# Patient Record
Sex: FEMALE | Race: WHITE | NOT HISPANIC OR LATINO | Employment: OTHER | URBAN - NONMETROPOLITAN AREA
[De-identification: names, ages, dates, MRNs, and addresses within clinical notes are randomized per-mention and may not be internally consistent; named-entity substitution may affect disease eponyms.]

---

## 2022-08-03 ENCOUNTER — LAB (OUTPATIENT)
Dept: CARDIOLOGY | Facility: CLINIC | Age: 72
End: 2022-08-03

## 2022-08-03 ENCOUNTER — OFFICE VISIT (OUTPATIENT)
Dept: CARDIOLOGY | Facility: CLINIC | Age: 72
End: 2022-08-03

## 2022-08-03 VITALS
WEIGHT: 101.4 LBS | OXYGEN SATURATION: 92 % | DIASTOLIC BLOOD PRESSURE: 92 MMHG | BODY MASS INDEX: 19.14 KG/M2 | SYSTOLIC BLOOD PRESSURE: 125 MMHG | HEIGHT: 61 IN | HEART RATE: 69 BPM

## 2022-08-03 DIAGNOSIS — R06.02 SOB (SHORTNESS OF BREATH): ICD-10-CM

## 2022-08-03 DIAGNOSIS — R93.89 ABNORMAL CT OF THE CHEST: ICD-10-CM

## 2022-08-03 DIAGNOSIS — R53.82 CHRONIC FATIGUE: ICD-10-CM

## 2022-08-03 DIAGNOSIS — R07.89 CHEST PAIN, ATYPICAL: ICD-10-CM

## 2022-08-03 DIAGNOSIS — R07.89 CHEST PAIN, ATYPICAL: Primary | ICD-10-CM

## 2022-08-03 LAB
ALBUMIN SERPL-MCNC: 4.37 G/DL (ref 3.5–5.2)
ALBUMIN/GLOB SERPL: 1.4 G/DL
ALP SERPL-CCNC: 68 U/L (ref 39–117)
ALT SERPL W P-5'-P-CCNC: 12 U/L (ref 1–33)
ANION GAP SERPL CALCULATED.3IONS-SCNC: 11.4 MMOL/L (ref 5–15)
AST SERPL-CCNC: 24 U/L (ref 1–32)
BASOPHILS # BLD AUTO: 0.07 10*3/MM3 (ref 0–0.2)
BASOPHILS NFR BLD AUTO: 1 % (ref 0–1.5)
BILIRUB SERPL-MCNC: 0.2 MG/DL (ref 0–1.2)
BUN SERPL-MCNC: 14 MG/DL (ref 8–23)
BUN/CREAT SERPL: 23.3 (ref 7–25)
CALCIUM SPEC-SCNC: 9.7 MG/DL (ref 8.6–10.5)
CHLORIDE SERPL-SCNC: 104 MMOL/L (ref 98–107)
CHOLEST SERPL-MCNC: 244 MG/DL (ref 0–200)
CO2 SERPL-SCNC: 22.6 MMOL/L (ref 22–29)
CREAT SERPL-MCNC: 0.6 MG/DL (ref 0.57–1)
DEPRECATED RDW RBC AUTO: 49.9 FL (ref 37–54)
EGFRCR SERPLBLD CKD-EPI 2021: 94.3 ML/MIN/1.73
EOSINOPHIL # BLD AUTO: 0.87 10*3/MM3 (ref 0–0.4)
EOSINOPHIL NFR BLD AUTO: 12.9 % (ref 0.3–6.2)
ERYTHROCYTE [DISTWIDTH] IN BLOOD BY AUTOMATED COUNT: 13.5 % (ref 12.3–15.4)
GLOBULIN UR ELPH-MCNC: 3 GM/DL
GLUCOSE SERPL-MCNC: 81 MG/DL (ref 65–99)
HCT VFR BLD AUTO: 41.2 % (ref 34–46.6)
HDLC SERPL-MCNC: 59 MG/DL (ref 40–60)
HGB BLD-MCNC: 13.2 G/DL (ref 12–15.9)
IMM GRANULOCYTES # BLD AUTO: 0.03 10*3/MM3 (ref 0–0.05)
IMM GRANULOCYTES NFR BLD AUTO: 0.4 % (ref 0–0.5)
LDLC SERPL CALC-MCNC: 171 MG/DL (ref 0–100)
LDLC/HDLC SERPL: 2.86 {RATIO}
LYMPHOCYTES # BLD AUTO: 1.78 10*3/MM3 (ref 0.7–3.1)
LYMPHOCYTES NFR BLD AUTO: 26.3 % (ref 19.6–45.3)
MAGNESIUM SERPL-MCNC: 2.3 MG/DL (ref 1.6–2.4)
MCH RBC QN AUTO: 32 PG (ref 26.6–33)
MCHC RBC AUTO-ENTMCNC: 32 G/DL (ref 31.5–35.7)
MCV RBC AUTO: 99.8 FL (ref 79–97)
MONOCYTES # BLD AUTO: 1.08 10*3/MM3 (ref 0.1–0.9)
MONOCYTES NFR BLD AUTO: 16 % (ref 5–12)
NEUTROPHILS NFR BLD AUTO: 2.94 10*3/MM3 (ref 1.7–7)
NEUTROPHILS NFR BLD AUTO: 43.4 % (ref 42.7–76)
NRBC BLD AUTO-RTO: 0 /100 WBC (ref 0–0.2)
PLATELET # BLD AUTO: 231 10*3/MM3 (ref 140–450)
PMV BLD AUTO: 10.8 FL (ref 6–12)
POTASSIUM SERPL-SCNC: 5.1 MMOL/L (ref 3.5–5.2)
PROT SERPL-MCNC: 7.4 G/DL (ref 6–8.5)
RBC # BLD AUTO: 4.13 10*6/MM3 (ref 3.77–5.28)
SODIUM SERPL-SCNC: 138 MMOL/L (ref 136–145)
TRIGL SERPL-MCNC: 81 MG/DL (ref 0–150)
TROPONIN T SERPL-MCNC: <0.01 NG/ML (ref 0–0.03)
TSH SERPL DL<=0.05 MIU/L-ACNC: 3.74 UIU/ML (ref 0.27–4.2)
VLDLC SERPL-MCNC: 14 MG/DL (ref 5–40)
WBC NRBC COR # BLD: 6.77 10*3/MM3 (ref 3.4–10.8)

## 2022-08-03 PROCEDURE — 84484 ASSAY OF TROPONIN QUANT: CPT | Performed by: NURSE PRACTITIONER

## 2022-08-03 PROCEDURE — 93000 ELECTROCARDIOGRAM COMPLETE: CPT | Performed by: NURSE PRACTITIONER

## 2022-08-03 PROCEDURE — 99204 OFFICE O/P NEW MOD 45 MIN: CPT | Performed by: NURSE PRACTITIONER

## 2022-08-03 PROCEDURE — 84443 ASSAY THYROID STIM HORMONE: CPT | Performed by: NURSE PRACTITIONER

## 2022-08-03 PROCEDURE — 83735 ASSAY OF MAGNESIUM: CPT | Performed by: NURSE PRACTITIONER

## 2022-08-03 PROCEDURE — 80061 LIPID PANEL: CPT | Performed by: NURSE PRACTITIONER

## 2022-08-03 PROCEDURE — 85025 COMPLETE CBC W/AUTO DIFF WBC: CPT | Performed by: NURSE PRACTITIONER

## 2022-08-03 PROCEDURE — 80053 COMPREHEN METABOLIC PANEL: CPT | Performed by: NURSE PRACTITIONER

## 2022-08-03 PROCEDURE — 36415 COLL VENOUS BLD VENIPUNCTURE: CPT

## 2022-08-03 RX ORDER — MIRTAZAPINE 15 MG/1
15 TABLET, FILM COATED ORAL NIGHTLY
COMMUNITY

## 2022-08-03 RX ORDER — NITROGLYCERIN 0.4 MG/1
TABLET SUBLINGUAL
Qty: 30 TABLET | Refills: 5 | Status: SHIPPED | OUTPATIENT
Start: 2022-08-03

## 2022-08-03 NOTE — PROGRESS NOTES
Subjective     Maria R Boss is a 72 y.o. female who presents to day for CAD on CT  (Referred by Dr Barahona).    CHIEF COMPLIANT  Chief Complaint   Patient presents with   • CAD on CT      Referred by Dr Barahona       Active Problems:  Problem List Items Addressed This Visit    None     Visit Diagnoses     Chest pain, atypical    -  Primary    Relevant Medications    nitroglycerin (NITROSTAT) 0.4 MG SL tablet    Other Relevant Orders    ECG 12 Lead    CBC & Differential    Comprehensive Metabolic Panel    Lipid Panel    TSH    Magnesium    Troponin    Adult Transthoracic Echo Complete W/ Cont if Necessary Per Protocol    Stress Test With Myocardial Perfusion One Day    SOB (shortness of breath)        Relevant Orders    ECG 12 Lead    CBC & Differential    Comprehensive Metabolic Panel    Lipid Panel    TSH    Magnesium    Troponin    Adult Transthoracic Echo Complete W/ Cont if Necessary Per Protocol    Stress Test With Myocardial Perfusion One Day    Chronic fatigue        Relevant Orders    ECG 12 Lead    CBC & Differential    Comprehensive Metabolic Panel    Lipid Panel    TSH    Magnesium    Troponin    Adult Transthoracic Echo Complete W/ Cont if Necessary Per Protocol    Stress Test With Myocardial Perfusion One Day    Abnormal CT of the chest        Relevant Orders    ECG 12 Lead    CBC & Differential    Comprehensive Metabolic Panel    Lipid Panel    TSH    Magnesium    Troponin    Adult Transthoracic Echo Complete W/ Cont if Necessary Per Protocol    Stress Test With Myocardial Perfusion One Day      Problem list  1.  Chest pain  2.  Shortness of breath  3.  Fatigue  4.  Coronary artery disease on CT of chest    HPI  HPI  Ms. Boss is a 72-year-old female patient who is being seen today to establish care for cardiac evaluation of atherosclerotic heart disease on CT of the chest.  Patient also reports chest pain that occurs in her midsternal and radiates through to her back and her left arm at times.  It is  intermittent but occurs on a daily basis sometimes multiple times a day.  She describes as a heaviness or squeezing-like sensation.  She says it last for an average of 15 to 20 minutes.  Usually occurs when she is exerting or overexerts herself.  She says rest usually does help resolve her pain.  She has not had any nitroglycerin.  She says it does get to a severity of 8-10/2010.  She says at times she even gets a really hot feeling.  She also reports some intermittent shortness of breath that mainly occurs if she is walking this distance.  She usually does not experience any dyspnea at rest.  She does use oxygen at night and does seem like that helps quite a bit.  She is active on a daily basis and mows her yard and stays active.  17 years ago she had a pneumothorax and ever since she has had breathing troubles.  She has history of diagnosed with COPD.  Overall she feels like she has been relatively healthy.  She denies any lower extremity edema, palpitations, dizziness, lightheadedness, syncope, PND, orthopnea, or strokelike symptoms.  PRIOR MEDS  Current Outpatient Medications on File Prior to Visit   Medication Sig Dispense Refill   • Fluticasone-Umeclidin-Vilant (Trelegy Ellipta) 100-62.5-25 MCG/INH inhaler Inhale 1 puff Daily.     • mirtazapine (REMERON) 15 MG tablet Take 15 mg by mouth Every Night. 1/2 tab po qd       No current facility-administered medications on file prior to visit.       ALLERGIES  Patient has no allergy information on record.    HISTORY  Past Medical History:   Diagnosis Date   • COPD (chronic obstructive pulmonary disease) ([facility])        Social History     Socioeconomic History   • Marital status:    Tobacco Use   • Smoking status: Former Smoker     Years: 55.00   • Smokeless tobacco: Never Used   Substance and Sexual Activity   • Alcohol use: Never   • Drug use: Never   • Sexual activity: Defer       Family History   Problem Relation Age of Onset   • Heart disease Mother    •  "Heart disease Father    • Cancer Father        Review of Systems   Constitutional: Positive for fatigue. Negative for chills and fever.   HENT: Positive for sore throat. Negative for congestion and rhinorrhea.    Respiratory: Positive for shortness of breath (with exertion). Negative for chest tightness.    Cardiovascular: Positive for chest pain (in middle of chest ). Negative for palpitations and leg swelling.   Gastrointestinal: Negative for constipation, diarrhea and nausea.   Musculoskeletal: Positive for arthralgias (hands). Negative for back pain and neck pain.   Allergic/Immunologic: Positive for environmental allergies. Negative for food allergies.   Neurological: Positive for weakness. Negative for dizziness, syncope and light-headedness.   Hematological: Bruises/bleeds easily.   Psychiatric/Behavioral: Positive for sleep disturbance (O2 at night ).       Objective     VITALS: /92 (BP Location: Left arm, Patient Position: Sitting)   Pulse 69   Ht 154.9 cm (61\")   Wt 46 kg (101 lb 6.4 oz)   SpO2 92%   BMI 19.16 kg/m²     LABS:   Lab Results (most recent)     None          IMAGING:   No Images in the past 120 days found..    EXAM:  Physical Exam  Vitals and nursing note reviewed.   Constitutional:       Appearance: She is well-developed.   HENT:      Head: Normocephalic.   Eyes:      Pupils: Pupils are equal, round, and reactive to light.   Neck:      Thyroid: No thyroid mass.      Vascular: No carotid bruit or JVD.      Trachea: Trachea and phonation normal.   Cardiovascular:      Rate and Rhythm: Normal rate and regular rhythm.      Pulses:           Radial pulses are 2+ on the right side and 2+ on the left side.        Posterior tibial pulses are 2+ on the right side and 2+ on the left side.      Heart sounds: Normal heart sounds. No murmur heard.    No friction rub. No gallop.   Pulmonary:      Effort: Pulmonary effort is normal. No respiratory distress.      Breath sounds: Normal breath " sounds. No wheezing or rales.   Abdominal:      General: Bowel sounds are normal.      Palpations: Abdomen is soft.   Musculoskeletal:         General: No swelling. Normal range of motion.      Cervical back: Neck supple.   Skin:     General: Skin is warm and dry.      Capillary Refill: Capillary refill takes less than 2 seconds.      Findings: No rash.   Neurological:      Mental Status: She is alert and oriented to person, place, and time.   Psychiatric:         Speech: Speech normal.         Behavior: Behavior normal.         Thought Content: Thought content normal.         Judgment: Judgment normal.         Procedure     ECG 12 Lead    Date/Time: 8/3/2022 11:40 AM  Performed by: Shwan Aranda APRN  Authorized by: Shawn Aranda APRN   Previous ECG: no previous ECG available  Rhythm: sinus rhythm  Rate: normal  BPM: 68  QRS axis: normal  Other findings: non-specific ST-T wave changes  Comments: 427 ms  No acute changes                 Assessment & Plan    Diagnosis Plan   1. Chest pain, atypical  ECG 12 Lead    CBC & Differential    Comprehensive Metabolic Panel    Lipid Panel    TSH    Magnesium    nitroglycerin (NITROSTAT) 0.4 MG SL tablet    Troponin    Adult Transthoracic Echo Complete W/ Cont if Necessary Per Protocol    Stress Test With Myocardial Perfusion One Day   2. SOB (shortness of breath)  ECG 12 Lead    CBC & Differential    Comprehensive Metabolic Panel    Lipid Panel    TSH    Magnesium    Troponin    Adult Transthoracic Echo Complete W/ Cont if Necessary Per Protocol    Stress Test With Myocardial Perfusion One Day   3. Chronic fatigue  ECG 12 Lead    CBC & Differential    Comprehensive Metabolic Panel    Lipid Panel    TSH    Magnesium    Troponin    Adult Transthoracic Echo Complete W/ Cont if Necessary Per Protocol    Stress Test With Myocardial Perfusion One Day   4. Abnormal CT of the chest  ECG 12 Lead    CBC & Differential    Comprehensive Metabolic Panel    Lipid Panel    TSH     Magnesium    Troponin    Adult Transthoracic Echo Complete W/ Cont if Necessary Per Protocol    Stress Test With Myocardial Perfusion One Day   1.  Due to patient's abnormal CT that showed atherosclerotic heart disease, chest pain that occurs with exertion, and shortness of breath I do feel it is appropriate to have patient undergo ischemic work-up including stress test and echocardiogram.  2.  I would also like to do an extensive laboratory work-up including CBC CMP, lipid panel, TSH, magnesium, and troponin.  This will help evaluate patient for potential symptoms or other chronic disease processes that have not been diagnosed.  3.  Patient's blood pressure is elevated little today which probably most likely situational.  She will continue to monitor blood pressure we will reevaluate on follow-up.  4.  Informed of signs and symptoms of ACS and advised to seek emergent treatment for any new worsening symptoms.  Patient also advised sooner follow-up as needed.  Also advised to follow-up with family doctor as needed  This note is dictated utilizing voice recognition software.  Although this record has been proof read, transcriptional errors may still be present. If questions occur regarding the content of this record please do not hesitate to call our office.  I have reviewed and confirmed the accuracy of the ROS as documented by the MA/LPN/RN DELILAH Richardson    Return in about 3 months (around 11/3/2022), or if symptoms worsen or fail to improve.    Diagnoses and all orders for this visit:    1. Chest pain, atypical (Primary)  -     ECG 12 Lead  -     CBC & Differential; Future  -     Comprehensive Metabolic Panel; Future  -     Lipid Panel; Future  -     TSH; Future  -     Magnesium; Future  -     nitroglycerin (NITROSTAT) 0.4 MG SL tablet; 1 under the tongue as needed for angina, may repeat q5mins for up three doses  Dispense: 30 tablet; Refill: 5  -     Troponin; Future  -     Adult Transthoracic Echo  Complete W/ Cont if Necessary Per Protocol; Future  -     Stress Test With Myocardial Perfusion One Day; Future    2. SOB (shortness of breath)  -     ECG 12 Lead  -     CBC & Differential; Future  -     Comprehensive Metabolic Panel; Future  -     Lipid Panel; Future  -     TSH; Future  -     Magnesium; Future  -     Troponin; Future  -     Adult Transthoracic Echo Complete W/ Cont if Necessary Per Protocol; Future  -     Stress Test With Myocardial Perfusion One Day; Future    3. Chronic fatigue  -     ECG 12 Lead  -     CBC & Differential; Future  -     Comprehensive Metabolic Panel; Future  -     Lipid Panel; Future  -     TSH; Future  -     Magnesium; Future  -     Troponin; Future  -     Adult Transthoracic Echo Complete W/ Cont if Necessary Per Protocol; Future  -     Stress Test With Myocardial Perfusion One Day; Future    4. Abnormal CT of the chest  -     ECG 12 Lead  -     CBC & Differential; Future  -     Comprehensive Metabolic Panel; Future  -     Lipid Panel; Future  -     TSH; Future  -     Magnesium; Future  -     Troponin; Future  -     Adult Transthoracic Echo Complete W/ Cont if Necessary Per Protocol; Future  -     Stress Test With Myocardial Perfusion One Day; Future        Maria R Boss  reports that she has quit smoking. She quit after 55.00 years of use. She has never used smokeless tobacco.. I have educated her on the risk of diseases from using tobacco products .    Advance Care Planning   ACP discussion was held with the patient during this visit. Patient does not have an advance directive, declines further assistance.              MEDS ORDERED DURING VISIT:  New Medications Ordered This Visit   Medications   • nitroglycerin (NITROSTAT) 0.4 MG SL tablet     Si under the tongue as needed for angina, may repeat q5mins for up three doses     Dispense:  30 tablet     Refill:  5           This document has been electronically signed by Shawn Aranda Jr., APRN  August 3, 2022 13:47 EDT

## 2022-08-04 ENCOUNTER — TELEPHONE (OUTPATIENT)
Dept: CARDIOLOGY | Facility: CLINIC | Age: 72
End: 2022-08-04

## 2022-08-04 NOTE — PROGRESS NOTES
Labs are relatively within normal limits except for her LDL that is 177.  Patient was found to have an elevated LDL per laboratory work-up.  Discuss lifestyle modifications that would help reduce their LDL cholesterol.  Discussed decreasing foods of animal product such as meats cheese and eggs.  And when choosing me chicken or fish are a better alternative than red meats.    See if patient would be willing to start statin therapy of rosuvastatin 20 mg daily.

## 2022-08-04 NOTE — TELEPHONE ENCOUNTER
Shawn Aranda, DELILAH Snider, FELIX Reyes  Labs are relatively within normal limits except for her LDL that is 177.  Patient was found to have an elevated LDL per laboratory work-up.  Discuss lifestyle modifications that would help reduce their LDL cholesterol.  Discussed decreasing foods of animal product such as meats cheese and eggs.  And when choosing me chicken or fish are a better alternative than red meats.     See if patient would be willing to start statin therapy of rosuvastatin 20 mg daily.     Patient was notified she will modify her diet. She does not wants to start any new medications at this time. She said she has very little help and would not be able to pick it up.      Sosa JOHNSA

## 2022-08-12 ENCOUNTER — HOSPITAL ENCOUNTER (OUTPATIENT)
Dept: CARDIOLOGY | Facility: HOSPITAL | Age: 72
Discharge: HOME | End: 2022-08-12

## 2022-08-12 ENCOUNTER — APPOINTMENT (OUTPATIENT)
Dept: CARDIOLOGY | Facility: HOSPITAL | Age: 72
End: 2022-08-12

## 2022-08-12 VITALS — WEIGHT: 101.41 LBS | HEIGHT: 61 IN | BODY MASS INDEX: 19.15 KG/M2

## 2022-08-12 DIAGNOSIS — R53.82 CHRONIC FATIGUE: ICD-10-CM

## 2022-08-12 DIAGNOSIS — R06.02 SOB (SHORTNESS OF BREATH): ICD-10-CM

## 2022-08-12 DIAGNOSIS — R07.89 CHEST PAIN, ATYPICAL: ICD-10-CM

## 2022-08-12 DIAGNOSIS — R93.89 ABNORMAL CT OF THE CHEST: ICD-10-CM

## 2022-08-12 PROCEDURE — 93306 TTE W/DOPPLER COMPLETE: CPT | Performed by: INTERNAL MEDICINE

## 2022-08-12 PROCEDURE — 93306 TTE W/DOPPLER COMPLETE: CPT

## 2022-08-12 PROCEDURE — A9500 TC99M SESTAMIBI: HCPCS | Performed by: INTERNAL MEDICINE

## 2022-08-12 PROCEDURE — 93018 CV STRESS TEST I&R ONLY: CPT | Performed by: INTERNAL MEDICINE

## 2022-08-12 PROCEDURE — 0 TECHNETIUM SESTAMIBI: Performed by: INTERNAL MEDICINE

## 2022-08-12 PROCEDURE — 25010000002 REGADENOSON 0.4 MG/5ML SOLUTION: Performed by: INTERNAL MEDICINE

## 2022-08-12 PROCEDURE — 78452 HT MUSCLE IMAGE SPECT MULT: CPT

## 2022-08-12 PROCEDURE — 78452 HT MUSCLE IMAGE SPECT MULT: CPT | Performed by: INTERNAL MEDICINE

## 2022-08-12 PROCEDURE — 93017 CV STRESS TEST TRACING ONLY: CPT

## 2022-08-12 RX ADMIN — TECHNETIUM TC 99M SESTAMIBI 1 DOSE: 1 INJECTION INTRAVENOUS at 11:24

## 2022-08-12 RX ADMIN — TECHNETIUM TC 99M SESTAMIBI 1 DOSE: 1 INJECTION INTRAVENOUS at 13:28

## 2022-08-12 RX ADMIN — REGADENOSON 0.4 MG: 0.08 INJECTION, SOLUTION INTRAVENOUS at 13:28

## 2022-08-15 ENCOUNTER — TELEPHONE (OUTPATIENT)
Dept: CARDIOLOGY | Facility: CLINIC | Age: 72
End: 2022-08-15

## 2022-08-15 LAB
BH CV REST NUCLEAR ISOTOPE DOSE: 10 MCI
BH CV STRESS COMMENTS STAGE 1: NORMAL
BH CV STRESS DOSE REGADENOSON STAGE 1: 0.4
BH CV STRESS DURATION MIN STAGE 1: 0
BH CV STRESS DURATION SEC STAGE 1: 10
BH CV STRESS NUCLEAR ISOTOPE DOSE: 30 MCI
BH CV STRESS PROTOCOL 1: NORMAL
BH CV STRESS RECOVERY BP: NORMAL MMHG
BH CV STRESS RECOVERY HR: 83 BPM
BH CV STRESS STAGE 1: 1
MAXIMAL PREDICTED HEART RATE: 146 BPM
PERCENT MAX PREDICTED HR: 62.33 %
STRESS BASELINE BP: NORMAL MMHG
STRESS BASELINE HR: 70 BPM
STRESS PERCENT HR: 73 %
STRESS POST PEAK BP: NORMAL MMHG
STRESS POST PEAK HR: 91 BPM
STRESS TARGET HR: 124 BPM

## 2022-08-15 NOTE — TELEPHONE ENCOUNTER
----- Message from DELILAH Richardson sent at 8/15/2022  8:14 AM EDT -----  Positive stress test.  1 to 2-week follow-up.      Called patient but did not get a voicemail will try back later today.    Sosa HOFFMAN

## 2022-08-19 NOTE — TELEPHONE ENCOUNTER
Patient called along with daughter in law (Niesha hoffman) they both were speaking with me regarding this. Maria R gave me a verbal okay to speak to them both and they will update hippa at upcoming appointment.    I advised them of results below. Patient states she will be at [facility] and verbally understands.    DAIJA KATZ MA

## 2022-08-24 LAB
AORTIC DIMENSIONLESS INDEX: 0.81 (DI)
BH CV ECHO MEAS - ACS: 1.39 CM
BH CV ECHO MEAS - AO MAX PG: 5.6 MMHG
BH CV ECHO MEAS - AO MEAN PG: 2.7 MMHG
BH CV ECHO MEAS - AO ROOT DIAM: 2.48 CM
BH CV ECHO MEAS - AO V2 MAX: 117.9 CM/SEC
BH CV ECHO MEAS - AO V2 VTI: 26.5 CM
BH CV ECHO MEAS - EDV(CUBED): 42.9 ML
BH CV ECHO MEAS - EF_3D-VOL: 59 %
BH CV ECHO MEAS - ESV(CUBED): 14 ML
BH CV ECHO MEAS - FS: 31.1 %
BH CV ECHO MEAS - IVS/LVPW: 1.18 CM
BH CV ECHO MEAS - IVSD: 0.85 CM
BH CV ECHO MEAS - LA DIMENSION: 2.8 CM
BH CV ECHO MEAS - LAT PEAK E' VEL: 8.4 CM/SEC
BH CV ECHO MEAS - LV MASS(C)D: 73.3 GRAMS
BH CV ECHO MEAS - LV MAX PG: 3.7 MMHG
BH CV ECHO MEAS - LV MEAN PG: 1.5 MMHG
BH CV ECHO MEAS - LV V1 MAX: 96.4 CM/SEC
BH CV ECHO MEAS - LV V1 VTI: 19.7 CM
BH CV ECHO MEAS - LVIDD: 3.5 CM
BH CV ECHO MEAS - LVIDS: 2.41 CM
BH CV ECHO MEAS - LVPWD: 0.72 CM
BH CV ECHO MEAS - MED PEAK E' VEL: 7.6 CM/SEC
BH CV ECHO MEAS - MR MAX PG: 56.2 MMHG
BH CV ECHO MEAS - MR MAX VEL: 374.8 CM/SEC
BH CV ECHO MEAS - MV A MAX VEL: 73.5 CM/SEC
BH CV ECHO MEAS - MV DEC SLOPE: 297.3 CM/SEC2
BH CV ECHO MEAS - MV DEC TIME: 0.3 MSEC
BH CV ECHO MEAS - MV E MAX VEL: 54.6 CM/SEC
BH CV ECHO MEAS - MV E/A: 0.74
BH CV ECHO MEAS - MV MAX PG: 3.4 MMHG
BH CV ECHO MEAS - MV MEAN PG: 1.28 MMHG
BH CV ECHO MEAS - MV P1/2T: 76.1 MSEC
BH CV ECHO MEAS - MV V2 VTI: 30.2 CM
BH CV ECHO MEAS - MVA(P1/2T): 2.9 CM2
BH CV ECHO MEAS - PA V2 MAX: 92.7 CM/SEC
BH CV ECHO MEAS - RAP SYSTOLE: 10 MMHG
BH CV ECHO MEAS - RV MAX PG: 1.62 MMHG
BH CV ECHO MEAS - RV V1 MAX: 63.7 CM/SEC
BH CV ECHO MEAS - RV V1 VTI: 14.5 CM
BH CV ECHO MEAS - RVDD: 2.6 CM
BH CV ECHO MEAS - RVSP: 15.4 MMHG
BH CV ECHO MEAS - TAPSE (>1.6): 2 CM
BH CV ECHO MEAS - TR MAX PG: 5.4 MMHG
BH CV ECHO MEAS - TR MAX VEL: 115.9 CM/SEC
BH CV ECHO MEASUREMENTS AVERAGE E/E' RATIO: 6.83
BH CV XLRA - TDI S': 10 CM/SEC
IVRT: 106 MSEC
MAXIMAL PREDICTED HEART RATE: 146 BPM
SINUS: 2.6 CM
STJ: 1.93 CM
STRESS TARGET HR: 124 BPM

## 2022-08-25 ENCOUNTER — TELEPHONE (OUTPATIENT)
Dept: CARDIOLOGY | Facility: CLINIC | Age: 72
End: 2022-08-25

## 2022-08-25 NOTE — TELEPHONE ENCOUNTER
ECHO  Pt notified of no acute findings. Provider will discuss results at f/u. Pt reminded of appt date and time.    ----- Message from Amy Snider MA sent at 8/25/2022  9:56 AM EDT -----    ----- Message -----  From: Shawn Aranda APRN  Sent: 8/24/2022   4:50 PM EDT  To: Amy Snider MA    There is no acute findings on the echocardiogram.  Keep follow-up.  ----- Message -----  From: Enrico Saez MD  Sent: 8/24/2022   2:07 PM EDT  To: DELILAH Richardson

## 2022-08-31 ENCOUNTER — OFFICE VISIT (OUTPATIENT)
Dept: CARDIOLOGY | Facility: CLINIC | Age: 72
End: 2022-08-31

## 2022-08-31 VITALS
SYSTOLIC BLOOD PRESSURE: 142 MMHG | HEIGHT: 61 IN | BODY MASS INDEX: 18.92 KG/M2 | OXYGEN SATURATION: 93 % | DIASTOLIC BLOOD PRESSURE: 71 MMHG | WEIGHT: 100.2 LBS | HEART RATE: 70 BPM

## 2022-08-31 DIAGNOSIS — R06.02 SOB (SHORTNESS OF BREATH): ICD-10-CM

## 2022-08-31 DIAGNOSIS — E78.2 MIXED HYPERLIPIDEMIA: ICD-10-CM

## 2022-08-31 DIAGNOSIS — R94.39 ABNORMAL NUCLEAR STRESS TEST: ICD-10-CM

## 2022-08-31 DIAGNOSIS — R06.02 SHORTNESS OF BREATH: ICD-10-CM

## 2022-08-31 DIAGNOSIS — R07.2 PRECORDIAL PAIN: Primary | ICD-10-CM

## 2022-08-31 DIAGNOSIS — R93.89 ABNORMAL CT OF THE CHEST: ICD-10-CM

## 2022-08-31 PROCEDURE — 99214 OFFICE O/P EST MOD 30 MIN: CPT | Performed by: NURSE PRACTITIONER

## 2022-08-31 RX ORDER — ISOSORBIDE MONONITRATE 30 MG/1
30 TABLET, EXTENDED RELEASE ORAL DAILY
Qty: 30 TABLET | Refills: 11 | Status: SHIPPED | OUTPATIENT
Start: 2022-08-31 | End: 2023-03-01 | Stop reason: SDUPTHER

## 2022-08-31 RX ORDER — ALBUTEROL SULFATE 90 UG/1
AEROSOL, METERED RESPIRATORY (INHALATION)
COMMUNITY
Start: 2022-08-26

## 2022-08-31 RX ORDER — ROSUVASTATIN CALCIUM 20 MG/1
20 TABLET, COATED ORAL DAILY
Qty: 90 TABLET | Refills: 3 | Status: SHIPPED | OUTPATIENT
Start: 2022-08-31 | End: 2023-03-01 | Stop reason: SDUPTHER

## 2022-08-31 RX ORDER — ASPIRIN 81 MG/1
81 TABLET ORAL DAILY
Qty: 90 TABLET | Refills: 3 | Status: SHIPPED | OUTPATIENT
Start: 2022-08-31

## 2022-08-31 RX ORDER — METOPROLOL TARTRATE 100 MG/1
TABLET ORAL
Qty: 2 TABLET | Refills: 0 | Status: SHIPPED | OUTPATIENT
Start: 2022-08-31 | End: 2023-03-01

## 2022-10-21 ENCOUNTER — TELEPHONE (OUTPATIENT)
Dept: CARDIOLOGY | Facility: CLINIC | Age: 72
End: 2022-10-21

## 2022-10-21 DIAGNOSIS — R94.39 ABNORMAL NUCLEAR STRESS TEST: ICD-10-CM

## 2022-10-21 DIAGNOSIS — R93.89 ABNORMAL CT OF THE CHEST: ICD-10-CM

## 2022-10-21 DIAGNOSIS — R07.2 PRECORDIAL PAIN: ICD-10-CM

## 2022-10-21 DIAGNOSIS — R06.02 SHORTNESS OF BREATH: ICD-10-CM

## 2022-10-21 DIAGNOSIS — R06.02 SOB (SHORTNESS OF BREATH): ICD-10-CM

## 2022-10-21 NOTE — TELEPHONE ENCOUNTER
I contacted Sherry that I will pull the CTA from [facility] and give to the provider and someone will be calling them back. I have put results in Sosa box to go over with

## 2022-10-21 NOTE — TELEPHONE ENCOUNTER
Results pulled from [facility] and attached to order for  to review. Madiha Chang MA

## 2022-10-21 NOTE — TELEPHONE ENCOUNTER
Caller: Surinder Boss    Relationship: Self    Best call back number: [phone]    What is the best time to reach you: ANYTIME     Who are you requesting to speak with (clinical staff, provider,  specific staff member): ANYONE     What was the call regarding: PATIENT HAD CTA DONE ON 10.18.22 AT [facility]. WOULD LIKE TO KNOW IF OFFICE CAN REACH OUT FOR THAT TEST AND LET THE PATIENT KNOW OF THE RESULTS.     Do you require a callback: YES

## 2022-10-24 ENCOUNTER — TELEPHONE (OUTPATIENT)
Dept: CARDIOLOGY | Facility: CLINIC | Age: 72
End: 2022-10-24

## 2022-10-24 NOTE — TELEPHONE ENCOUNTER
Message routed to JR for results/recommendations. Left message for patient that we will call once available.

## 2022-10-24 NOTE — TELEPHONE ENCOUNTER
----- Message from DELILAH Richardson sent at 10/21/2022 12:20 PM EDT -----  Patient's coronary arteries with out any significant blockages.  However there was a pulmonary nodule that needs to be continue to be monitored.  Please see if patient has pulmonologist if not I would like to refer them for continued monitoring of nodules.  ----- Message -----  From: Interface, Scans Incoming  Sent: 10/21/2022  10:21 AM EDT  To: DELILAH Richardson      I called patient and left a detailed message on voicemail of above results. I asked patient to call back and let us know if she is currently being seen by pulmonology or if she would like us to refer her.      Sosa HOFFMAN

## 2022-10-24 NOTE — TELEPHONE ENCOUNTER
Caller: FRITZ    Relationship to patient: DAUGHTER IN LAW    Best call back number: [phone]    Patient is needing: PATIENT'S DAUGHTER IN LAW RETURNING CALL AND UNABLE TO WARM TRANSFER

## 2022-10-24 NOTE — TELEPHONE ENCOUNTER
FRITZ SCHAEFFER CALLED TO GET RECENT TEST RESULTS. WOULD LIKE A CALL BACK 'ASAP' FROM LUISA JESSICA.     [phone]

## 2022-10-25 NOTE — TELEPHONE ENCOUNTER
No blockages were noted on her coronaries per the CTA.  However it did look as if she did have some pulmonary nodules and a lymph node enlargement.  Therefore I did have Carla   refer patient to pulmonology for further evaluation and work-up from that standpoint

## 2022-10-27 ENCOUNTER — TELEPHONE (OUTPATIENT)
Dept: CARDIOLOGY | Facility: CLINIC | Age: 72
End: 2022-10-27

## 2022-10-27 NOTE — TELEPHONE ENCOUNTER
"    Caller: Surinder Boss    Relationship to patient: Self    Best call back number:  [phone]    Patient is needing: PT WAS REFERRED TO \"\"FOR NODULES ON HER LUNGS. DR. DAY IS REQUESTING THAT PT'S CTA OF HEART BE SENT TO HER . PT DID NOT HAVE FAX NUMBER         "

## 2022-11-16 ENCOUNTER — OFFICE VISIT (OUTPATIENT)
Dept: CARDIOLOGY | Facility: CLINIC | Age: 72
End: 2022-11-16

## 2022-11-16 VITALS
WEIGHT: 104.4 LBS | OXYGEN SATURATION: 92 % | BODY MASS INDEX: 19.71 KG/M2 | SYSTOLIC BLOOD PRESSURE: 128 MMHG | DIASTOLIC BLOOD PRESSURE: 76 MMHG | HEART RATE: 81 BPM | HEIGHT: 61 IN

## 2022-11-16 DIAGNOSIS — R07.2 PRECORDIAL PAIN: ICD-10-CM

## 2022-11-16 DIAGNOSIS — Q24.5 CORONARY-MYOCARDIAL BRIDGE: Primary | ICD-10-CM

## 2022-11-16 DIAGNOSIS — R06.02 SHORTNESS OF BREATH: ICD-10-CM

## 2022-11-16 DIAGNOSIS — E78.2 MIXED HYPERLIPIDEMIA: ICD-10-CM

## 2022-11-16 PROCEDURE — 99214 OFFICE O/P EST MOD 30 MIN: CPT | Performed by: NURSE PRACTITIONER

## 2022-11-16 RX ORDER — ARGININE 500 MG
500 TABLET ORAL DAILY
Qty: 30 TABLET | Refills: 6 | Status: SHIPPED | OUTPATIENT
Start: 2022-11-16 | End: 2022-12-21 | Stop reason: ALTCHOICE

## 2022-11-16 RX ORDER — ALBUTEROL SULFATE 2.5 MG/3ML
SOLUTION RESPIRATORY (INHALATION)
COMMUNITY
Start: 2022-11-10

## 2022-11-16 RX ORDER — BUDESONIDE 0.5 MG/2ML
INHALANT ORAL
COMMUNITY
Start: 2022-11-10

## 2022-11-16 NOTE — PROGRESS NOTES
Subjective     Surinder Boss is a 72 y.o. female who presents to day for Follow-up (3 month), Chest Pain (Not active), Shortness of Breath, and Dizziness.    CHIEF COMPLIANT  Chief Complaint   Patient presents with   • Follow-up     3 month   • Chest Pain     Not active   • Shortness of Breath   • Dizziness       Active Problems:  1.  Chest pain  1.1 stress test 8/22: Distal septal, anterior apical, and apical ischemia, post-rest ejection fraction 70%, transient ischemic dilation ratio 1.32  1.2 coronary CTA 8/22: No hemodynamically significant coronary artery stenosis  2.  Shortness of breath  2.1 echocardiogram 8/22: EF 55 to 60%, grade 1 diastolic dysfunction, physiological TR, trivial to mild MR, and mild AI, normal pulmonary pressures  3.  Fatigue  4.  Coronary artery disease on CT of chest    HPI    Surinder Boss is a 72-year-old female who is being followed up today for chest pain and dyspnea.The patient is accompanied by an adult female. She did undergo a cardiac CT angiogram on 08/31/2022. The CT angiogram showed relatively normal coronary arteries with minimal plaque and no hemodynamically significant stenosis. There were findings of a subcarinal node measuring 2.5 cm that is stable from prior study and atherosclerotic plaque in her aorta. There is evidence of emphysema and she had a 5 mm nodule seen on a prominent right lower lobe bronchial structure, mucous plugging possible.The patient has myocardial bridging of the obtuse margin. She also has chronic arterial hypertension which her blood pressure is controlled today at 128/76 mmHg with a heart rate of 81 beats per minute. She is on high intensity statin therapy of rosuvastatin and aspirin for antiplatelet therapy.    The patient's visitor states that they were advised to contact Dr. Barahona's office for the results of the CT angiogram. This was not effective, so the patient was seen by her primary care provider, DELILAH Cash for the results. The  "patient will have another CT done after 2022. The patient's primary care provider also ordered the patient a pulmonary vest. The patient has been fitted for this.    The patient has family history of cancer. The patient's father  from cancer. The patient's brother  due to lung cancer. Her son  from Hodgkin's lymphoma and [id number] disease.    The patient's visitor states that the patient experiences dyspnea and a \"sluggish\" feeling.  The patient's visitor wonders about repeating cardiac testing due to the false positive stress test.    The patient was told that her cholesterol levels were high in 2022. She currently takes rosuvastatin. The patient's low-density lipoprotein was 171 mg/dL, her high-density lipoprotein was 59 mg/dL, and her triglycerides were 81 mg/dL. The patient's total cholesterol was 244 mg/dL. The patient's thyroid levels, magnesium, electrolytes, kidney and liver function, blood volume, and platelets were normal. Her white blood cell count was normal as well. The patient states that she needs to monitor her diet to lower her cholesterol levels.     The patient states that she experiences chest pain approximately 3 to 4 times per week if she is extremely fatigued. This is midsternal pain. She states that it does not \"take me down,\" and it relieves with rest. She does not experience diaphoresis with this, but she states that she needs to monitor her activity to prevent dyspnea. The patient is tired easily. She experiences dizziness with a change of positions or rushing as well. She states that resting improves this. The patient believes that the isosorbide was effective. She states that the chest pain does not occur often, and only with rushing.     The patient's visitor states that the patient experiences restless sleep. The patient wakes up feeling fatigued. The patient does not know if she snores. The patient states that she woke up gasping for air \"the other " "morning.\" She states that she normally sleeps with oxygen supplementation, and this is helpful.    The patient's visitor states that the patient has been experiencing memory issues. The patient's visitor states that the patient is often unsure. The patient's visitor states that the patient experiences shakiness as well. The patient states that she has experienced this for multiple years. The patient has not experienced near-syncope recently, although she has experienced this in the past. The patient states that she bruises easily as well.    The patient utilizes Pulmicort and albuterol. The patient states that she uses this as-needed. She then states that she utilizes 1 at morning, and 1 at nighttime.    The patient states that she is not seen by physicians often. The patient states that her most recent general physician appointment was in 2018.    PRIOR MEDS  Current Outpatient Medications on File Prior to Visit   Medication Sig Dispense Refill   • albuterol (PROVENTIL) (2.5 MG/3ML) 0.083% nebulizer solution      • albuterol sulfate  (90 Base) MCG/ACT inhaler      • aspirin (aspirin) 81 MG EC tablet Take 1 tablet by mouth Daily. 90 tablet 3   • budesonide (PULMICORT) 0.5 MG/2ML nebulizer solution      • isosorbide mononitrate (IMDUR) 30 MG 24 hr tablet Take 1 tablet by mouth Daily. 30 tablet 11   • mirtazapine (REMERON) 15 MG tablet Take 15 mg by mouth Every Night. 1/2 tab po qd     • nitroglycerin (NITROSTAT) 0.4 MG SL tablet 1 under the tongue as needed for angina, may repeat q5mins for up three doses 30 tablet 5   • rosuvastatin (CRESTOR) 20 MG tablet Take 1 tablet by mouth Daily. 90 tablet 3   • metoprolol tartrate (LOPRESSOR) 100 MG tablet Take 100mg morning of CTA take second dose to CTA and only take if instructed to 2 tablet 0     No current facility-administered medications on file prior to visit.       ALLERGIES  Patient has no known allergies.    HISTORY  Past Medical History:   Diagnosis Date   • " "COPD (chronic obstructive pulmonary disease) ([facility])        Social History     Socioeconomic History   • Marital status:    Tobacco Use   • Smoking status: Former     Years: 55.00     Types: Cigarettes   • Smokeless tobacco: Never   Vaping Use   • Vaping Use: Never used   Substance and Sexual Activity   • Alcohol use: Never   • Drug use: Never   • Sexual activity: Defer       Family History   Problem Relation Age of Onset   • Heart disease Mother    • Heart disease Father    • Cancer Father        Review of Systems   Constitutional: Positive for fatigue. Negative for chills and fever.   HENT: Negative.  Negative for congestion, ear discharge, ear pain, facial swelling, hearing loss, nosebleeds, sinus pressure, sinus pain, sneezing and sore throat.    Eyes: Negative.  Negative for pain, discharge, redness, itching and visual disturbance.   Respiratory: Positive for shortness of breath.    Cardiovascular: Positive for chest pain.   Gastrointestinal: Negative.  Negative for abdominal pain, blood in stool, constipation, diarrhea, nausea and vomiting.   Endocrine: Negative.    Genitourinary: Negative for flank pain and hematuria.   Musculoskeletal: Positive for back pain. Negative for joint swelling and neck pain.   Skin: Negative.    Allergic/Immunologic: Negative.    Neurological: Positive for dizziness.   Hematological: Negative.    Psychiatric/Behavioral: Positive for sleep disturbance (Pt states she sleeps 3/4 hours restfully).       Objective     VITALS: /76 (BP Location: Left arm, Patient Position: Sitting, Cuff Size: Adult)   Pulse 81   Ht 154 cm (60.63\")   Wt 47.4 kg (104 lb 6.4 oz)   SpO2 92%   BMI 19.97 kg/m²     LABS:   Lab Results (most recent)     None          IMAGING:   No Images in the past 120 days found..    EXAM:  Physical Exam  Vitals and nursing note reviewed.   Constitutional:       Appearance: She is well-developed.   HENT:      Head: Normocephalic.   Eyes:      Pupils: Pupils are " equal, round, and reactive to light.   Neck:      Thyroid: No thyroid mass.      Vascular: No carotid bruit or JVD.      Trachea: Trachea and phonation normal.   Cardiovascular:      Rate and Rhythm: Normal rate and regular rhythm.      Pulses:           Radial pulses are 2+ on the right side and 2+ on the left side.        Posterior tibial pulses are 2+ on the right side and 2+ on the left side.      Heart sounds: Normal heart sounds. No murmur heard.    No friction rub. No gallop.   Pulmonary:      Effort: Pulmonary effort is normal. No respiratory distress.      Breath sounds: Normal breath sounds. No wheezing or rales.   Abdominal:      General: Bowel sounds are normal.      Palpations: Abdomen is soft.   Musculoskeletal:         General: No swelling. Normal range of motion.      Cervical back: Neck supple.   Skin:     General: Skin is warm and dry.      Capillary Refill: Capillary refill takes less than 2 seconds.      Findings: No rash.   Neurological:      Mental Status: She is alert and oriented to person, place, and time.   Psychiatric:         Speech: Speech normal.         Behavior: Behavior normal.         Thought Content: Thought content normal.         Judgment: Judgment normal.         Procedure   Procedures       Assessment & Plan    Diagnosis Plan   1. Coronary-myocardial bridge  arginine 500 MG tablet      2. Precordial pain  arginine 500 MG tablet      3. Shortness of breath  arginine 500 MG tablet      4. Mixed hyperlipidemia  arginine 500 MG tablet      1. The patient was educated on her cardiac testing results in detail. All questions were answered. The patient does not have any indications for a left heart catheterization currently.  2. The patient was prescribed arginine 500 mg for myocardial bridging. The patient will make contact if this medication is ineffective after 1 month. The patient will also continue to take isosorbide.  3. The patient will begin to monitor her symptoms. The patient  will bring a journal to her next appointment to determine if there is any specific triggers.  4.  Informed of signs and symptoms of ACS and advised to seek emergent treatment for any new worsening symptoms.  Patient also advised sooner follow-up as needed.  Also advised to follow-up with family doctor as needed  This note is dictated utilizing voice recognition software.  Although this record has been proof read, transcriptional errors may still be present. If questions occur regarding the content of this record please do not hesitate to call our office.  I have reviewed and confirmed the accuracy of the ROS as documented by the MA/LPN/RN DELILAH Richardson    Return in about 3 months (around 2/16/2023), or if symptoms worsen or fail to improve.    Diagnoses and all orders for this visit:    1. Coronary-myocardial bridge (Primary)  -     arginine 500 MG tablet; Take 1 tablet by mouth Daily.  Dispense: 30 tablet; Refill: 6    2. Precordial pain  -     arginine 500 MG tablet; Take 1 tablet by mouth Daily.  Dispense: 30 tablet; Refill: 6    3. Shortness of breath  -     arginine 500 MG tablet; Take 1 tablet by mouth Daily.  Dispense: 30 tablet; Refill: 6    4. Mixed hyperlipidemia  -     arginine 500 MG tablet; Take 1 tablet by mouth Daily.  Dispense: 30 tablet; Refill: 6        Surinder Boss  reports that she has quit smoking. Her smoking use included cigarettes. She has never used smokeless tobacco.. I have educated her on the risk of diseases from using tobacco products     Advance Care Planning   ACP discussion was held with the patient during this visit. Patient does not have an advance directive, information provided.              MEDS ORDERED DURING VISIT:  New Medications Ordered This Visit   Medications   • arginine 500 MG tablet     Sig: Take 1 tablet by mouth Daily.     Dispense:  30 tablet     Refill:  6           This document has been electronically signed by DELILAH Richardson Jr.  November 16, 2022  13:08 EST     Transcribed from ambient dictation for DELILAH Richardson by Valerie Rhodes.  11/16/22   14:42 EST    Patient or patient representative verbalized consent to the visit recording.  I have personally performed the services described in this document as transcribed by the above individual, and it is both accurate and complete.

## 2022-12-20 ENCOUNTER — TELEPHONE (OUTPATIENT)
Dept: CARDIOLOGY | Facility: CLINIC | Age: 72
End: 2022-12-20

## 2022-12-20 DIAGNOSIS — E78.2 MIXED HYPERLIPIDEMIA: ICD-10-CM

## 2022-12-20 DIAGNOSIS — R06.02 SHORTNESS OF BREATH: ICD-10-CM

## 2022-12-20 DIAGNOSIS — R07.2 PRECORDIAL PAIN: ICD-10-CM

## 2022-12-20 DIAGNOSIS — Q24.5 CORONARY-MYOCARDIAL BRIDGE: ICD-10-CM

## 2022-12-20 NOTE — TELEPHONE ENCOUNTER
We actually can increase the dose 2000 mg daily if she is seeing benefit.  And continue this medication and add refills as needed.

## 2022-12-20 NOTE — TELEPHONE ENCOUNTER
Caller: Surinder Boss    Relationship: Self    Best call back number: [phone]    PATIENT IS ADVISING THAT SHE WANTS TO CONTINUE TAKING ARGININE

## 2022-12-21 NOTE — TELEPHONE ENCOUNTER
I called, Niesha answered and stated that pt hasn't been having as many pains in her chest. She stated that pt is still not pro medicine, but does state it helps some. I stated that we can increase the dose if they're wanting to see if that helps more. She requested a 30-day supply be sent in to try.

## 2023-03-01 ENCOUNTER — OFFICE VISIT (OUTPATIENT)
Dept: CARDIOLOGY | Facility: CLINIC | Age: 73
End: 2023-03-01
Payer: MEDICARE

## 2023-03-01 VITALS
WEIGHT: 105.6 LBS | HEART RATE: 54 BPM | OXYGEN SATURATION: 90 % | SYSTOLIC BLOOD PRESSURE: 134 MMHG | BODY MASS INDEX: 19.94 KG/M2 | HEIGHT: 61 IN | DIASTOLIC BLOOD PRESSURE: 70 MMHG

## 2023-03-01 DIAGNOSIS — R06.02 SHORTNESS OF BREATH: ICD-10-CM

## 2023-03-01 DIAGNOSIS — R00.2 PALPITATIONS: Primary | ICD-10-CM

## 2023-03-01 DIAGNOSIS — E78.2 MIXED HYPERLIPIDEMIA: ICD-10-CM

## 2023-03-01 PROCEDURE — 99213 OFFICE O/P EST LOW 20 MIN: CPT | Performed by: NURSE PRACTITIONER

## 2023-03-01 RX ORDER — ERGOCALCIFEROL (VITAMIN D2) 10 MCG
400 TABLET ORAL DAILY
COMMUNITY

## 2023-03-01 RX ORDER — ROSUVASTATIN CALCIUM 20 MG/1
20 TABLET, COATED ORAL DAILY
Qty: 90 TABLET | Refills: 3 | Status: SHIPPED | OUTPATIENT
Start: 2023-03-01

## 2023-03-01 RX ORDER — ISOSORBIDE MONONITRATE 30 MG/1
30 TABLET, EXTENDED RELEASE ORAL DAILY
Qty: 90 TABLET | Refills: 3 | Status: SHIPPED | OUTPATIENT
Start: 2023-03-01

## 2023-03-01 RX ORDER — IBANDRONATE SODIUM 150 MG/1
150 TABLET, FILM COATED ORAL
COMMUNITY
Start: 2023-02-09

## 2023-03-01 RX ORDER — IPRATROPIUM BROMIDE AND ALBUTEROL SULFATE 2.5; .5 MG/3ML; MG/3ML
SOLUTION RESPIRATORY (INHALATION)
COMMUNITY
Start: 2023-02-09

## 2023-03-01 RX ORDER — ERGOCALCIFEROL 1.25 MG/1
50000 CAPSULE ORAL WEEKLY
COMMUNITY

## 2023-03-01 NOTE — PROGRESS NOTES
Subjective     Surinder Boss is a 73 y.o. female who presents to day for Follow-up (3 MTH F/U ).    CHIEF COMPLIANT  Chief Complaint   Patient presents with   • Follow-up     3 MTH F/U        Active Problems:  1.  Chest pain  2 stress test 8/22: Distal septal, anterior apical, and apical ischemia, post-rest ejection fraction 70%, transient ischemic dilation ratio 1.32  3. coronary CTA 8/22: No hemodynamically significant coronary artery stenosis  4.  Shortness of breath  5. echocardiogram 8/22: EF 55 to 60%, grade 1 diastolic dysfunction, physiological TR, trivial to mild MR, and mild AI, normal pulmonary pressures  6.  Fatigue  7.  Coronary artery disease on CT of chest  Problem List Items Addressed This Visit    None  Visit Diagnoses     Palpitations    -  Primary    Shortness of breath        Relevant Medications    isosorbide mononitrate (IMDUR) 30 MG 24 hr tablet    Mixed hyperlipidemia        Relevant Medications    rosuvastatin (CRESTOR) 20 MG tablet        HPI  HPI  Surinder Boss is a 73-year-old female who is being followed up today for 3-month follow-up. She does have a history of coronary artery disease, in which she had a stress test that showed distal septal, anteroapical, and apical ischemia. Post-rest ejection fraction of 70 percent in 08/2023. She did go under a coronary CTA in 08/2022 as well that showed no hemodynamically significant coronary artery disease. She is on isosorbide for antianginal therapy, rosuvastatin for hyperlipidemia, beta blocker therapy, metoprolol, and antiplatelet therapy of aspirin. The patient is accompanied by an adult female.    The patient's blood pressure today is 134/70 mmHg with a heart rate of 54 beats per minute. She states that she continues to experience palpitations. Shes notes the palpitations occur during exertion. The adult female reports the patient does not feel well on a regular basis. She states the patient complains of fatigue. The patient reports her vitamin  D level was low so that could have contributed to the fatigue. The adult female states the patient recently had lab work done and her TSH was elevated. The patient notes that she is takin her vitamin d medication. The adult female reports the patient's TSH level fluctuates which could be due to stress. The patient states that she gets tired of being in the house, she is an outdoor person. She denies chest pain, dyspnea, left arm pain, left jaw pain, and increasing fatigue. She reports she can walk up a flight of stairs. The adult female states the patient was provided a vest that she does not use. The patient reports she only used the vest once and it caused pain. She notes she may have seen a benefit in it if she would use it and it not cause her pain.    The patient's CBC was normal. Her blood volume and platelet count were normal. Her electrolytes, kidney function, liver function, and magnesium were normal.    Current medications include isosorbide, rosuvastatin, and aspirin 81 mg.  She denies any chest pain, shortness of breath, lower extremity edema, dizziness, syncope, PND, orthopnea, or strokelike symptoms.    PRIOR MEDS  Current Outpatient Medications on File Prior to Visit   Medication Sig Dispense Refill   • albuterol (PROVENTIL) (2.5 MG/3ML) 0.083% nebulizer solution      • albuterol sulfate  (90 Base) MCG/ACT inhaler      • aspirin (aspirin) 81 MG EC tablet Take 1 tablet by mouth Daily. 90 tablet 3   • budesonide (PULMICORT) 0.5 MG/2ML nebulizer solution      • ibandronate (BONIVA) 150 MG tablet Take 1 tablet by mouth.     • ipratropium-albuterol (DUO-NEB) 0.5-2.5 mg/3 ml nebulizer      • mirtazapine (REMERON) 15 MG tablet Take 1 tablet by mouth Every Night. 1/2 tab po qd     • nitroglycerin (NITROSTAT) 0.4 MG SL tablet 1 under the tongue as needed for angina, may repeat q5mins for up three doses 30 tablet 5   • vitamin D (ERGOCALCIFEROL) 1.25 MG (29968 UT) capsule capsule Take 1 capsule by mouth  1 (One) Time Per Week.     • Vitamin D, Cholecalciferol, (CHOLECALCIFEROL) 10 MCG (400 UNIT) tablet Take 1 tablet by mouth Daily.     • [DISCONTINUED] isosorbide mononitrate (IMDUR) 30 MG 24 hr tablet Take 1 tablet by mouth Daily. 30 tablet 11   • [DISCONTINUED] metoprolol tartrate (LOPRESSOR) 100 MG tablet Take 100mg morning of CTA take second dose to CTA and only take if instructed to 2 tablet 0   • [DISCONTINUED] rosuvastatin (CRESTOR) 20 MG tablet Take 1 tablet by mouth Daily. 90 tablet 3     No current facility-administered medications on file prior to visit.       ALLERGIES  Patient has no known allergies.    HISTORY  Past Medical History:   Diagnosis Date   • COPD (chronic obstructive pulmonary disease) ([facility])        Social History     Socioeconomic History   • Marital status:    Tobacco Use   • Smoking status: Former     Years: 55.00     Types: Cigarettes   • Smokeless tobacco: Never   Vaping Use   • Vaping Use: Never used   Substance and Sexual Activity   • Alcohol use: Never   • Drug use: Never   • Sexual activity: Defer       Family History   Problem Relation Age of Onset   • Heart disease Mother    • Heart disease Father    • Cancer Father        Review of Systems   Constitutional: Negative for appetite change, chills and fever.   HENT: Negative.  Negative for dental problem, drooling, ear discharge, ear pain, rhinorrhea, sinus pressure, sinus pain, sneezing and sore throat.    Eyes: Negative for pain, redness, itching and visual disturbance.   Respiratory: Negative for cough, choking and wheezing.    Cardiovascular: Positive for palpitations. Negative for chest pain and leg swelling.   Gastrointestinal: Negative for blood in stool, constipation, diarrhea and nausea.   Genitourinary: Negative.  Negative for difficulty urinating.   Musculoskeletal: Negative for arthralgias, back pain and neck pain.   Skin: Negative for rash and wound.   Neurological: Positive for weakness. Negative for dizziness,  "syncope and numbness.   Psychiatric/Behavioral: Negative for sleep disturbance.       Objective     VITALS: /70   Pulse 54   Ht 154 cm (60.63\")   Wt 47.9 kg (105 lb 9.6 oz)   SpO2 90%   BMI 20.20 kg/m²     LABS:   Lab Results (most recent)     None          IMAGING:   No Images in the past 120 days found..    EXAM:  Physical Exam  Vitals and nursing note reviewed.   Constitutional:       Appearance: She is well-developed.   HENT:      Head: Normocephalic.   Eyes:      Pupils: Pupils are equal, round, and reactive to light.   Neck:      Thyroid: No thyroid mass.      Vascular: No carotid bruit or JVD.      Trachea: Trachea and phonation normal.   Cardiovascular:      Rate and Rhythm: Normal rate and regular rhythm.      Pulses:           Radial pulses are 2+ on the right side and 2+ on the left side.        Posterior tibial pulses are 2+ on the right side and 2+ on the left side.      Heart sounds: Normal heart sounds. No murmur heard.    No friction rub. No gallop.   Pulmonary:      Effort: Pulmonary effort is normal. No respiratory distress.      Breath sounds: Normal breath sounds. No wheezing or rales.   Abdominal:      General: Bowel sounds are normal.      Palpations: Abdomen is soft.   Musculoskeletal:         General: No swelling. Normal range of motion.      Cervical back: Neck supple.   Skin:     General: Skin is warm and dry.      Capillary Refill: Capillary refill takes less than 2 seconds.      Findings: No rash.   Neurological:      Mental Status: She is alert and oriented to person, place, and time.   Psychiatric:         Speech: Speech normal.         Behavior: Behavior normal.         Thought Content: Thought content normal.         Judgment: Judgment normal.         Procedure   Procedures       Assessment & Plan    Diagnosis Plan   1. Palpitations        2. Shortness of breath  isosorbide mononitrate (IMDUR) 30 MG 24 hr tablet      3. Mixed hyperlipidemia  rosuvastatin (CRESTOR) 20 MG " tablet        PLAN  1. The patient's stress test results were discussed in full detail with the patient.  2. The patient continues to experience palpitations during exertion. She will continue her current medications.  3. The patient's isosorbide and rosuvastatin was refilled.  4.  Patient seems to be doing relatively well from a cardiovascular standpoint and denies any angina anginal equivalent symptoms.  5.  Informed of signs and symptoms of ACS and advised to seek emergent treatment for any new worsening symptoms.  Patient also advised sooner follow-up as needed.  Also advised to follow-up with family doctor as needed  This note is dictated utilizing voice recognition software.  Although this record has been proof read, transcriptional errors may still be present. If questions occur regarding the content of this record please do not hesitate to call our office.  I have reviewed and confirmed the accuracy of the ROS as documented by the MA/LPN/DELILAH Nogueira  ASSESSMENT  1. Coronary artery disease  2. Palpitations  3. Shortness of breath  4. Hyperlipidemia    Return in about 6 months (around 9/1/2023), or if symptoms worsen or fail to improve.    Diagnoses and all orders for this visit:    1. Palpitations (Primary)    2. Shortness of breath  -     isosorbide mononitrate (IMDUR) 30 MG 24 hr tablet; Take 1 tablet by mouth Daily.  Dispense: 90 tablet; Refill: 3    3. Mixed hyperlipidemia  -     rosuvastatin (CRESTOR) 20 MG tablet; Take 1 tablet by mouth Daily.  Dispense: 90 tablet; Refill: 3      Surinder Boss  reports that she has quit smoking. Her smoking use included cigarettes. She has never used smokeless tobacco..    Advance Care Planning   ACP discussion was declined by the patient. Patient does not have an advance directive, declines further assistance.          MEDS ORDERED DURING VISIT:  New Medications Ordered This Visit   Medications   • isosorbide mononitrate (IMDUR) 30 MG 24 hr tablet     Sig:  Take 1 tablet by mouth Daily.     Dispense:  90 tablet     Refill:  3   • rosuvastatin (CRESTOR) 20 MG tablet     Sig: Take 1 tablet by mouth Daily.     Dispense:  90 tablet     Refill:  3           This document has been electronically signed by DELILAH Richardson Jr.  March 1, 2023 15:21 EST    Transcribed from ambient dictation for DELILAH Richardson by Michelle Soriano.  03/01/23   16:09 EST    Patient or patient representative verbalized consent to the visit recording.  I have personally performed the services described in this document as transcribed by the above individual, and it is both accurate and complete.

## 2023-09-13 ENCOUNTER — OFFICE VISIT (OUTPATIENT)
Dept: CARDIOLOGY | Facility: CLINIC | Age: 73
End: 2023-09-13
Payer: MEDICARE

## 2023-09-13 VITALS
WEIGHT: 100.2 LBS | SYSTOLIC BLOOD PRESSURE: 133 MMHG | HEIGHT: 61 IN | OXYGEN SATURATION: 88 % | BODY MASS INDEX: 18.92 KG/M2 | DIASTOLIC BLOOD PRESSURE: 75 MMHG | HEART RATE: 69 BPM

## 2023-09-13 DIAGNOSIS — R06.02 SHORTNESS OF BREATH: Primary | ICD-10-CM

## 2023-09-13 DIAGNOSIS — R42 DIZZY: ICD-10-CM

## 2023-09-13 DIAGNOSIS — R07.2 PRECORDIAL PAIN: ICD-10-CM

## 2023-09-13 PROCEDURE — 1159F MED LIST DOCD IN RCRD: CPT | Performed by: NURSE PRACTITIONER

## 2023-09-13 PROCEDURE — 99213 OFFICE O/P EST LOW 20 MIN: CPT | Performed by: NURSE PRACTITIONER

## 2023-09-13 PROCEDURE — 1160F RVW MEDS BY RX/DR IN RCRD: CPT | Performed by: NURSE PRACTITIONER

## 2023-09-13 RX ORDER — ISOSORBIDE MONONITRATE 30 MG/1
30 TABLET, EXTENDED RELEASE ORAL DAILY
Qty: 90 TABLET | Refills: 3 | Status: SHIPPED | OUTPATIENT
Start: 2023-09-13

## 2023-09-13 NOTE — PROGRESS NOTES
"Pastora Boss is a 73 y.o. female who presents to day for 6 month follow up  and Chest Pain.    CHIEF COMPLIANT  Chief Complaint   Patient presents with    6 month follow up     Chest Pain     Active Problems:  Problem List Items Addressed This Visit    None  Visit Diagnoses       Shortness of breath    -  Primary    Relevant Medications    isosorbide mononitrate (IMDUR) 30 MG 24 hr tablet    Other Relevant Orders    ECG 12 Lead    Precordial pain        Relevant Orders    ECG 12 Lead    Dizzy        Relevant Orders    ECG 12 Lead          1.  Chest pain  2 stress test 8/22: Distal septal, anterior apical, and apical ischemia, post-rest ejection fraction 70%, transient ischemic dilation ratio 1.32  3. coronary CTA 8/22: No hemodynamically significant coronary artery stenosis  4.  Shortness of breath, COPD - Dr. Barahona  5. echocardiogram 8/22: EF 55 to 60%, grade 1 diastolic dysfunction, physiological TR, trivial to mild MR, and mild AI, normal pulmonary pressures  6.  Jaspreet Boss is a 73-year-old female patient who is being followed up today for palpitations. She did go under a coronary CT in 08/2023 that showed no hemodynamically significant coronary artery disease. She is on rosuvastatin for high intensity statin therapy. Today, her blood pressure is controlled at 133/75 mmHg in which she is on no antihypertensive medications.    The patient is accompanied by an adult female. The patient states that she is doing well.    The patient states that she has been experiencing chest discomfort. She states that the pain \"shoots\" when she lays down and sleeps. She states that the pain lasts for a few minutes. She states that ambulating alleviates the pain. She describes the discomfort as a squeezing sensation. She states that she has not experienced diaphoresis in a long time.The adult female states that the patient is not taking isosorbide.    She experiences dyspnea with exertion. She states " that if she gets out and rushes, she will experience dyspnea. She experiences dyspnea when she lays flat on her back.    She experiences dizziness when she stands up too quickly.    The patient's blood pressure today is 133/75 mmHg. Her heart rate is 69 beats per minute.    The adult female states that the patient's oxygen saturation was 78 percent when the patient's pulse was checked today. The adult female states that the patient is hypoxic.The patient states that she wears her oxygen at night. The patient's pulmonologist is Dr. Brooklyn Garcia. The patient is scheduled for another CT scan in 11/2023.    The patient's ejection fraction is 55 to 60 percent. Her diastolic dysfunction is 1 percent. Her pulmonary pressures are normal. The patient's stress test showed distal septal anteroapical and apical ischemia. Her ejection fraction increased to 70 percent. She had an elevated transischemic dilation ratio. The patient's CTA of the chest showed no hemodynamically significant blockage. The patient's CTA is normal. The patient's stress test was considered a false positive.    She states that the pain she experiences when she lays down is not the same when she eats too much. She states that the pain does not occur every night. She states that the pain occurs once a day. She states that the more she does during the day, the more likely the pain occurs. The adult female states that the patient's hands are cold all the time. The patient takes nitroglycerin sublingual as needed.    The patient uses an albuterol inhaler, albuterol nebulizer, aspirin, Pulmicort inhaler, DuoNeb inhaler, Remeron, rosuvastatin, vitamin D supplement, and Remeron.    The patient states that she smoked for years.  PRIOR MEDS  Current Outpatient Medications on File Prior to Visit   Medication Sig Dispense Refill    albuterol (PROVENTIL) (2.5 MG/3ML) 0.083% nebulizer solution       albuterol sulfate  (90 Base) MCG/ACT inhaler       aspirin  (aspirin) 81 MG EC tablet Take 1 tablet by mouth Daily. 90 tablet 3    budesonide (PULMICORT) 0.5 MG/2ML nebulizer solution       ipratropium-albuterol (DUO-NEB) 0.5-2.5 mg/3 ml nebulizer       mirtazapine (REMERON) 15 MG tablet Take 1 tablet by mouth Every Night. 1/2 tab po qd      nitroglycerin (NITROSTAT) 0.4 MG SL tablet 1 under the tongue as needed for angina, may repeat q5mins for up three doses 30 tablet 5    NON FORMULARY L arginine 500 mg      rosuvastatin (CRESTOR) 20 MG tablet Take 1 tablet by mouth Daily. 90 tablet 3    vitamin D (ERGOCALCIFEROL) 1.25 MG (04154 UT) capsule capsule Take 1 capsule by mouth 1 (One) Time Per Week.      [DISCONTINUED] ibandronate (BONIVA) 150 MG tablet Take 1 tablet by mouth.      [DISCONTINUED] isosorbide mononitrate (IMDUR) 30 MG 24 hr tablet Take 1 tablet by mouth Daily. 90 tablet 3    [DISCONTINUED] Vitamin D, Cholecalciferol, (CHOLECALCIFEROL) 10 MCG (400 UNIT) tablet Take 1 tablet by mouth Daily.       No current facility-administered medications on file prior to visit.     ALLERGIES  Other    HISTORY  Past Medical History:   Diagnosis Date    COPD (chronic obstructive pulmonary disease)      Social History     Socioeconomic History    Marital status:    Tobacco Use    Smoking status: Former     Years: 55.00     Types: Cigarettes    Smokeless tobacco: Never   Vaping Use    Vaping Use: Never used   Substance and Sexual Activity    Alcohol use: Never    Drug use: Never    Sexual activity: Defer     Family History   Problem Relation Age of Onset    Heart disease Mother     Heart disease Father     Cancer Father      Review of Systems   Constitutional:  Negative for chills, fatigue and fever.   HENT:  Negative for congestion, rhinorrhea and sore throat.    Eyes:  Negative for visual disturbance.   Respiratory:  Positive for shortness of breath (with exertion). Negative for apnea and chest tightness.    Cardiovascular:  Positive for chest pain (has discomfort a lot  "has to get out of bed at night and move around). Negative for palpitations and leg swelling.   Gastrointestinal:  Negative for constipation, diarrhea and nausea.   Musculoskeletal:  Positive for arthralgias. Negative for back pain and neck pain.   Allergic/Immunologic: Negative for environmental allergies and food allergies.   Neurological:  Positive for dizziness (has to get up slow) and weakness. Negative for syncope and light-headedness.   Hematological:  Bruises/bleeds easily.   Psychiatric/Behavioral:  Positive for sleep disturbance (No SOB but has pain in chest a lot).      Objective     VITALS: /75 (BP Location: Left arm, Patient Position: Sitting, Cuff Size: Adult)   Pulse 69   Ht 154 cm (60.63\")   Wt 45.5 kg (100 lb 3.2 oz)   SpO2 (!) 88% Comment: rechecked x 2  BMI 19.16 kg/m²     LABS:   Lab Results (most recent)       None          IMAGING:   No Images in the past 120 days found..    EXAM:  Physical Exam  Vitals and nursing note reviewed.   Constitutional:       Appearance: She is well-developed.   HENT:      Head: Normocephalic.   Neck:      Thyroid: No thyroid mass.      Vascular: No carotid bruit or JVD.      Trachea: Trachea and phonation normal.   Cardiovascular:      Rate and Rhythm: Normal rate and regular rhythm.      Pulses:           Radial pulses are 2+ on the right side and 2+ on the left side.        Posterior tibial pulses are 2+ on the right side and 2+ on the left side.      Heart sounds: Normal heart sounds. No murmur heard.    No friction rub. No gallop.   Pulmonary:      Effort: Pulmonary effort is normal. No respiratory distress.      Breath sounds: Normal breath sounds. No wheezing or rales.   Musculoskeletal:         General: No swelling. Normal range of motion.      Cervical back: Neck supple.   Skin:     General: Skin is warm and dry.      Capillary Refill: Capillary refill takes less than 2 seconds.      Findings: No rash.   Neurological:      Mental Status: She is " alert and oriented to person, place, and time.   Psychiatric:         Speech: Speech normal.         Behavior: Behavior normal.         Thought Content: Thought content normal.         Judgment: Judgment normal.       Procedure     ECG 12 Lead    Date/Time: 9/13/2023 1:19 PM  Performed by: Shawn Aranda APRN  Authorized by: Shawn Aranda APRN   Comparison: compared with previous ECG from 8/3/2022  Similar to previous ECG  Rhythm: sinus rhythm  Rate: normal  BPM: 70  QRS axis: normal  Comments: QTc 421 ms  No acute changes       Assessment & Plan    Diagnosis Plan   1. Shortness of breath  ECG 12 Lead    isosorbide mononitrate (IMDUR) 30 MG 24 hr tablet      2. Precordial pain  ECG 12 Lead      3. Dizzy  ECG 12 Lead        1. The patient's most recent cardiac test results were reviewed and discussed in full detail with the patient.  2. The patient's medication regimen was reviewed and discussed in full detail with the patient.  She will restart the isosorbide  3. A prescription for isosorbide mononitrate has been sent to the patient's pharmacy.  This is to hopefully help control her atypical chest pain that mainly occurs at night and which she describes as a sharp shooting pain.  She did have a normal CTA of the heart in 8/2022.  4.  Informed of signs and symptoms of ACS and advised to seek emergent treatment for any new worsening symptoms.  Patient also advised sooner follow-up as needed.  Also advised to follow-up with family doctor as needed  This note is dictated utilizing voice recognition software.  Although this record has been proof read, transcriptional errors may still be present. If questions occur regarding the content of this record please do not hesitate to call our office.  I have reviewed and confirmed the accuracy of the ROS as documented by the MA/LPN/RN DELILAH Richardson    Assessment  1. Chest pain  2. Shortness of breath  3. Dizziness  4. COPD    Return if symptoms worsen or fail to  improve, for Next scheduled follow up.    Diagnoses and all orders for this visit:    1. Shortness of breath (Primary)  -     ECG 12 Lead  -     isosorbide mononitrate (IMDUR) 30 MG 24 hr tablet; Take 1 tablet by mouth Daily.  Dispense: 90 tablet; Refill: 3    2. Precordial pain  -     ECG 12 Lead    3. Dizzy  -     ECG 12 Lead      Surinder Boss  reports that she has quit smoking. Her smoking use included cigarettes. She has never used smokeless tobacco.. I have educated her on the risk of diseases from using tobacco products.     Advance Care Planning   ACP discussion was held with the patient during this visit. Patient does not have an advance directive, declines further assistance.             MEDS ORDERED DURING VISIT:  New Medications Ordered This Visit   Medications    isosorbide mononitrate (IMDUR) 30 MG 24 hr tablet     Sig: Take 1 tablet by mouth Daily.     Dispense:  90 tablet     Refill:  3           This document has been electronically signed by DELILAH Richardson Jr.  September 13, 2023 14:17 EDT      Transcribed from ambient dictation for DELILAH Richardson by Karen Coyle, Quality .  09/13/23   14:17 EDT    Patient or patient representative verbalized consent to the visit recording.  I have personally performed the services described in this document as transcribed by the above individual, and it is both accurate and complete.

## 2023-11-29 ENCOUNTER — TELEPHONE (OUTPATIENT)
Dept: CARDIOLOGY | Facility: CLINIC | Age: 73
End: 2023-11-29
Payer: MEDICARE

## 2023-11-29 DIAGNOSIS — R93.89 ABNORMAL CT OF THE CHEST: ICD-10-CM

## 2023-11-29 DIAGNOSIS — R42 DIZZY: ICD-10-CM

## 2023-11-29 DIAGNOSIS — R06.02 SHORTNESS OF BREATH: Primary | ICD-10-CM

## 2023-11-29 DIAGNOSIS — R07.2 PRECORDIAL PAIN: ICD-10-CM

## 2023-11-29 NOTE — TELEPHONE ENCOUNTER
Patient has been having tightness in her chest . She says it is constant and is staying SOB . She would like to move forward with stress test . I will message JR  and see if he would like for me to go ahead and order stress or if he would like for her to come in .

## 2023-11-29 NOTE — TELEPHONE ENCOUNTER
Caller: DAIJA    Relationship to patient: Emergency Contact    Best call back number [phone]    Chief complaint: DISCUSS ISOSORBIDE    Type of visit: F/U    Requested date: ASAP

## 2023-12-01 ENCOUNTER — TELEPHONE (OUTPATIENT)
Dept: CARDIOLOGY | Facility: CLINIC | Age: 73
End: 2023-12-01
Payer: MEDICARE

## 2023-12-01 RX ORDER — ISOSORBIDE MONONITRATE 60 MG/1
60 TABLET, EXTENDED RELEASE ORAL EVERY MORNING
Qty: 30 TABLET | Refills: 11 | Status: SHIPPED | OUTPATIENT
Start: 2023-12-01

## 2023-12-01 NOTE — TELEPHONE ENCOUNTER
Patient daughter Niesha called in with Ms Boss on speaker phone. I did explain that to have Niesha added she would have to come in and sign a form . I did let her know that we are ordering Stress Test Stat . I also advised if chest pain got worse to go to the ER. I did explain that JR would like for her to go up on Isosorbide to 60 mg qd .

## 2023-12-15 ENCOUNTER — HOSPITAL ENCOUNTER (OUTPATIENT)
Dept: CARDIOLOGY | Facility: HOSPITAL | Age: 73
Discharge: HOME | End: 2023-12-15
Payer: MEDICARE

## 2023-12-15 DIAGNOSIS — R07.2 PRECORDIAL PAIN: ICD-10-CM

## 2023-12-15 DIAGNOSIS — R42 DIZZY: ICD-10-CM

## 2023-12-15 DIAGNOSIS — R06.02 SHORTNESS OF BREATH: ICD-10-CM

## 2023-12-15 DIAGNOSIS — R93.89 ABNORMAL CT OF THE CHEST: ICD-10-CM

## 2023-12-15 LAB
BH CV REST NUCLEAR ISOTOPE DOSE: 10 MCI
BH CV STRESS COMMENTS STAGE 1: NORMAL
BH CV STRESS DOSE REGADENOSON STAGE 1: 0.4
BH CV STRESS DURATION MIN STAGE 1: 0
BH CV STRESS DURATION SEC STAGE 1: 10
BH CV STRESS NUCLEAR ISOTOPE DOSE: 30 MCI
BH CV STRESS PROTOCOL 1: NORMAL
BH CV STRESS RECOVERY BP: NORMAL MMHG
BH CV STRESS RECOVERY HR: 83 BPM
BH CV STRESS STAGE 1: 1
LV EF NUC BP: 84 %
MAXIMAL PREDICTED HEART RATE: 145 BPM
PERCENT MAX PREDICTED HR: 66.21 %
STRESS BASELINE BP: NORMAL MMHG
STRESS BASELINE HR: 71 BPM
STRESS PERCENT HR: 78 %
STRESS POST PEAK BP: NORMAL MMHG
STRESS POST PEAK HR: 96 BPM
STRESS TARGET HR: 123 BPM

## 2023-12-15 PROCEDURE — A9500 TC99M SESTAMIBI: HCPCS | Performed by: SPECIALIST

## 2023-12-15 PROCEDURE — 0 TECHNETIUM SESTAMIBI: Performed by: SPECIALIST

## 2023-12-15 PROCEDURE — 25010000002 REGADENOSON 0.4 MG/5ML SOLUTION: Performed by: SPECIALIST

## 2023-12-15 PROCEDURE — 93017 CV STRESS TEST TRACING ONLY: CPT

## 2023-12-15 PROCEDURE — 78452 HT MUSCLE IMAGE SPECT MULT: CPT

## 2023-12-15 RX ORDER — REGADENOSON 0.08 MG/ML
0.4 INJECTION, SOLUTION INTRAVENOUS
Status: COMPLETED | OUTPATIENT
Start: 2023-12-15 | End: 2023-12-15

## 2023-12-15 RX ADMIN — REGADENOSON 0.4 MG: 0.08 INJECTION, SOLUTION INTRAVENOUS at 11:20

## 2023-12-15 RX ADMIN — TECHNETIUM TC 99M SESTAMIBI 1 DOSE: 1 INJECTION INTRAVENOUS at 11:20

## 2023-12-15 RX ADMIN — TECHNETIUM TC 99M SESTAMIBI 1 DOSE: 1 INJECTION INTRAVENOUS at 09:58

## 2024-01-03 ENCOUNTER — TELEPHONE (OUTPATIENT)
Dept: CARDIOLOGY | Facility: CLINIC | Age: 74
End: 2024-01-03
Payer: MEDICARE

## 2024-01-03 NOTE — TELEPHONE ENCOUNTER
PATIENT NOTIFIED OF STRESS TEST RESULTS AND TO KEEP F/U APPT. DELICIA GARCIA          ----- Message from Christina Floyd sent at 1/3/2024 10:58 AM EST -----    ----- Message -----  From: Amy Snider MA  Sent: 12/21/2023  12:38 PM EST  To: REFUGIO Cristobal      ----- Message -----  From: Shawn Aranda APRN  Sent: 12/21/2023  10:11 AM EST  To: Amy Snider MA    Patient was found to have a normal stress test with no evidence of ischemia.  Keep follow-up.

## 2024-05-13 ENCOUNTER — OFFICE VISIT (OUTPATIENT)
Dept: CARDIOLOGY | Facility: CLINIC | Age: 74
End: 2024-05-13
Payer: MEDICARE

## 2024-05-13 ENCOUNTER — LAB (OUTPATIENT)
Dept: CARDIOLOGY | Facility: CLINIC | Age: 74
End: 2024-05-13
Payer: MEDICARE

## 2024-05-13 ENCOUNTER — TELEPHONE (OUTPATIENT)
Dept: CARDIOLOGY | Facility: CLINIC | Age: 74
End: 2024-05-13

## 2024-05-13 VITALS
DIASTOLIC BLOOD PRESSURE: 76 MMHG | WEIGHT: 97.6 LBS | OXYGEN SATURATION: 90 % | HEIGHT: 61 IN | HEART RATE: 71 BPM | SYSTOLIC BLOOD PRESSURE: 126 MMHG | BODY MASS INDEX: 18.43 KG/M2

## 2024-05-13 DIAGNOSIS — E78.2 MIXED HYPERLIPIDEMIA: ICD-10-CM

## 2024-05-13 DIAGNOSIS — R06.02 SHORTNESS OF BREATH: ICD-10-CM

## 2024-05-13 DIAGNOSIS — R07.2 PRECORDIAL PAIN: ICD-10-CM

## 2024-05-13 DIAGNOSIS — R07.2 PRECORDIAL PAIN: Primary | ICD-10-CM

## 2024-05-13 LAB
ALBUMIN SERPL-MCNC: 4.2 G/DL (ref 3.5–5.2)
ALBUMIN/GLOB SERPL: 1.3 G/DL
ALP SERPL-CCNC: 69 U/L (ref 39–117)
ALT SERPL W P-5'-P-CCNC: 13 U/L (ref 1–33)
ANION GAP SERPL CALCULATED.3IONS-SCNC: 11.9 MMOL/L (ref 5–15)
AST SERPL-CCNC: 22 U/L (ref 1–32)
BASOPHILS # BLD MANUAL: 0.27 10*3/MM3 (ref 0–0.2)
BASOPHILS NFR BLD MANUAL: 3 % (ref 0–1.5)
BILIRUB SERPL-MCNC: 0.4 MG/DL (ref 0–1.2)
BUN SERPL-MCNC: 17 MG/DL (ref 8–23)
BUN/CREAT SERPL: 25.8 (ref 7–25)
CALCIUM SPEC-SCNC: 9.6 MG/DL (ref 8.6–10.5)
CHLORIDE SERPL-SCNC: 104 MMOL/L (ref 98–107)
CHOLEST SERPL-MCNC: 258 MG/DL (ref 0–200)
CO2 SERPL-SCNC: 25.1 MMOL/L (ref 22–29)
CREAT SERPL-MCNC: 0.66 MG/DL (ref 0.57–1)
DEPRECATED RDW RBC AUTO: 49 FL (ref 37–54)
EGFRCR SERPLBLD CKD-EPI 2021: 91 ML/MIN/1.73
EOSINOPHIL # BLD MANUAL: 2.58 10*3/MM3 (ref 0–0.4)
EOSINOPHIL NFR BLD MANUAL: 29 % (ref 0.3–6.2)
ERYTHROCYTE [DISTWIDTH] IN BLOOD BY AUTOMATED COUNT: 13.2 % (ref 12.3–15.4)
GLOBULIN UR ELPH-MCNC: 3.3 GM/DL
GLUCOSE SERPL-MCNC: 90 MG/DL (ref 65–99)
HCT VFR BLD AUTO: 44.3 % (ref 34–46.6)
HDLC SERPL-MCNC: 71 MG/DL (ref 40–60)
HGB BLD-MCNC: 13.9 G/DL (ref 12–15.9)
LDLC SERPL CALC-MCNC: 165 MG/DL (ref 0–100)
LDLC/HDLC SERPL: 2.28 {RATIO}
LYMPHOCYTES # BLD MANUAL: 2.4 10*3/MM3 (ref 0.7–3.1)
LYMPHOCYTES NFR BLD MANUAL: 8 % (ref 5–12)
MAGNESIUM SERPL-MCNC: 2.2 MG/DL (ref 1.6–2.4)
MCH RBC QN AUTO: 31.7 PG (ref 26.6–33)
MCHC RBC AUTO-ENTMCNC: 31.4 G/DL (ref 31.5–35.7)
MCV RBC AUTO: 100.9 FL (ref 79–97)
MONOCYTES # BLD: 0.71 10*3/MM3 (ref 0.1–0.9)
NEUTROPHILS # BLD AUTO: 2.94 10*3/MM3 (ref 1.7–7)
NEUTROPHILS NFR BLD MANUAL: 33 % (ref 42.7–76)
PLAT MORPH BLD: NORMAL
PLATELET # BLD AUTO: 258 10*3/MM3 (ref 140–450)
PMV BLD AUTO: 10.4 FL (ref 6–12)
POTASSIUM SERPL-SCNC: 4.1 MMOL/L (ref 3.5–5.2)
PROT SERPL-MCNC: 7.5 G/DL (ref 6–8.5)
RBC # BLD AUTO: 4.39 10*6/MM3 (ref 3.77–5.28)
RBC MORPH BLD: NORMAL
SCAN SLIDE: NORMAL
SODIUM SERPL-SCNC: 141 MMOL/L (ref 136–145)
TRIGL SERPL-MCNC: 125 MG/DL (ref 0–150)
TSH SERPL DL<=0.05 MIU/L-ACNC: 2.76 UIU/ML (ref 0.27–4.2)
VARIANT LYMPHS NFR BLD MANUAL: 27 % (ref 19.6–45.3)
VLDLC SERPL-MCNC: 22 MG/DL (ref 5–40)
WBC NRBC COR # BLD AUTO: 8.9 10*3/MM3 (ref 3.4–10.8)

## 2024-05-13 PROCEDURE — 85025 COMPLETE CBC W/AUTO DIFF WBC: CPT | Performed by: NURSE PRACTITIONER

## 2024-05-13 PROCEDURE — 83735 ASSAY OF MAGNESIUM: CPT | Performed by: NURSE PRACTITIONER

## 2024-05-13 PROCEDURE — 82652 VIT D 1 25-DIHYDROXY: CPT | Performed by: NURSE PRACTITIONER

## 2024-05-13 PROCEDURE — 99214 OFFICE O/P EST MOD 30 MIN: CPT | Performed by: NURSE PRACTITIONER

## 2024-05-13 PROCEDURE — 1159F MED LIST DOCD IN RCRD: CPT | Performed by: NURSE PRACTITIONER

## 2024-05-13 PROCEDURE — 1160F RVW MEDS BY RX/DR IN RCRD: CPT | Performed by: NURSE PRACTITIONER

## 2024-05-13 PROCEDURE — 80061 LIPID PANEL: CPT | Performed by: NURSE PRACTITIONER

## 2024-05-13 PROCEDURE — 36415 COLL VENOUS BLD VENIPUNCTURE: CPT

## 2024-05-13 PROCEDURE — 80053 COMPREHEN METABOLIC PANEL: CPT | Performed by: NURSE PRACTITIONER

## 2024-05-13 PROCEDURE — 82607 VITAMIN B-12: CPT | Performed by: NURSE PRACTITIONER

## 2024-05-13 PROCEDURE — 85007 BL SMEAR W/DIFF WBC COUNT: CPT | Performed by: NURSE PRACTITIONER

## 2024-05-13 PROCEDURE — 93000 ELECTROCARDIOGRAM COMPLETE: CPT | Performed by: NURSE PRACTITIONER

## 2024-05-13 PROCEDURE — 82746 ASSAY OF FOLIC ACID SERUM: CPT | Performed by: NURSE PRACTITIONER

## 2024-05-13 PROCEDURE — 84443 ASSAY THYROID STIM HORMONE: CPT | Performed by: NURSE PRACTITIONER

## 2024-05-13 RX ORDER — RANOLAZINE 500 MG/1
500 TABLET, EXTENDED RELEASE ORAL 2 TIMES DAILY
Qty: 60 TABLET | Refills: 11 | Status: SHIPPED | OUTPATIENT
Start: 2024-05-13

## 2024-05-13 RX ORDER — ROSUVASTATIN CALCIUM 20 MG/1
20 TABLET, COATED ORAL DAILY
Qty: 90 TABLET | Refills: 3 | Status: SHIPPED | OUTPATIENT
Start: 2024-05-13

## 2024-05-13 NOTE — PROGRESS NOTES
CBC relatively within normal limits including normal white blood cell count, hemoglobin hematocrit, and platelet count.  TSH is normal.

## 2024-05-13 NOTE — PROGRESS NOTES
Subjective     Surinder Boss is a 74 y.o. female who presents to day for 8 month follow up , Palpitations (Feels like it is some better ), Shortness of Breath (Mainly at night is using O2), and Chest Pain (Has discomfort at times in center of chest ).    CHIEF COMPLIANT  Chief Complaint   Patient presents with    8 month follow up     Palpitations     Feels like it is some better     Shortness of Breath     Mainly at night is using O2    Chest Pain     Has discomfort at times in center of chest        Active Problems:  Problem List Items Addressed This Visit    None  Visit Diagnoses       Precordial pain    -  Primary    Relevant Orders    ECG 12 Lead    Comprehensive Metabolic Panel    CBC & Differential    Lipid Panel    TSH    Magnesium    Vitamin D 1,25 Dihydroxy    Vitamin B12    Folate    Shortness of breath        Relevant Orders    ECG 12 Lead    Comprehensive Metabolic Panel    CBC & Differential    Lipid Panel    TSH    Magnesium    Vitamin D 1,25 Dihydroxy    Vitamin B12    Folate    Mixed hyperlipidemia        Relevant Medications    ranolazine (Ranexa) 500 MG 12 hr tablet    rosuvastatin (CRESTOR) 20 MG tablet    Other Relevant Orders    ECG 12 Lead    Comprehensive Metabolic Panel    CBC & Differential    Lipid Panel    TSH    Magnesium    Vitamin D 1,25 Dihydroxy    Vitamin B12    Folate          1.  Chest pain  2  coronary CTA 8/22: No hemodynamically significant coronary artery stenosis  3 stress 12-23: -ischemia, EF >70%  4.  Shortness of breath, COPD - Dr. Barahona  5. echocardiogram 8/22: EF 55 to 60%, grade 1 diastolic dysfunction, physiological TR, trivial to mild MR, and mild AI, normal pulmonary pressures  6.  Fatigue    HPI  HPI  Ms. Maria R Boss is a 74-year-old female patient who is being followed up today for history of palpitations.She does have intermittent palpitations that occur in her chest mainly with exertion.    Patient does report chronic chest pain that is midsternal in which she  describes as an intermittent discomfort.  She says she did notice some relief with the isosorbide.  She is on rosuvastatin for statin therapy and aspirin for antiplatelet therapy.  She says the chest pain will occur 3-4 times a week.  She is unable to identify any aggravating factors.  She says it may last a few minutes and can last several hours.  Activity does not seem to be a trigger for her chest pain.    Patient does report shortness of breath which occurs with activity.  Says that she gets in a rush or a hurry that she becomes dyspneic.  She does not usually experience any dyspnea at rest.  However she does report some level of orthopnea which she will wake up smothering at night.  She does wear oxygen at night.    Patient does be have some lightheadedness that is mild.  Usually only occurs if she bends over.    Patient reports that she is usually very active without whole lot of limitation as long as she does not get in a hurry.  We did do a CTA of her heart that ruled out any hemodynamically significant coronary artery stenosis in August 2022.  Repeat stress test and December 2023 that was negative for ischemia with an EF greater than 70%.  PRIOR MEDS  Current Outpatient Medications on File Prior to Visit   Medication Sig Dispense Refill    albuterol (PROVENTIL) (2.5 MG/3ML) 0.083% nebulizer solution       albuterol sulfate  (90 Base) MCG/ACT inhaler       aspirin (aspirin) 81 MG EC tablet Take 1 tablet by mouth Daily. 90 tablet 3    budesonide (PULMICORT) 0.5 MG/2ML nebulizer solution       ipratropium-albuterol (DUO-NEB) 0.5-2.5 mg/3 ml nebulizer       isosorbide mononitrate (IMDUR) 60 MG 24 hr tablet Take 1 tablet by mouth Every Morning. 30 tablet 11    mirtazapine (REMERON) 15 MG tablet Take 1 tablet by mouth Every Night. 1/2 tab po qd      nitroglycerin (NITROSTAT) 0.4 MG SL tablet 1 under the tongue as needed for angina, may repeat q5mins for up three doses 30 tablet 5    [DISCONTINUED]  rosuvastatin (CRESTOR) 20 MG tablet Take 1 tablet by mouth Daily. 90 tablet 3    [DISCONTINUED] NON FORMULARY L arginine 500 mg      [DISCONTINUED] vitamin D (ERGOCALCIFEROL) 1.25 MG (31660 UT) capsule capsule Take 1 capsule by mouth 1 (One) Time Per Week.       No current facility-administered medications on file prior to visit.       ALLERGIES  Other    HISTORY  Past Medical History:   Diagnosis Date    COPD (chronic obstructive pulmonary disease)        Social History     Socioeconomic History    Marital status:    Tobacco Use    Smoking status: Former     Types: Cigarettes    Smokeless tobacco: Never   Vaping Use    Vaping status: Never Used   Substance and Sexual Activity    Alcohol use: Never    Drug use: Never    Sexual activity: Defer       Family History   Problem Relation Age of Onset    Heart disease Mother     Heart disease Father     Cancer Father        Review of Systems   Constitutional:  Negative for chills, fatigue and fever.   HENT:  Positive for rhinorrhea (off and on). Negative for congestion and sore throat.    Eyes:  Negative for visual disturbance.   Respiratory:  Positive for shortness of breath (with exertion and at night uses O2). Negative for apnea and chest tightness.    Cardiovascular:  Positive for chest pain (has some discomfort in center of chest off and on) and palpitations (with exertion). Negative for leg swelling.   Gastrointestinal:  Negative for constipation, diarrhea and nausea.   Musculoskeletal:  Positive for arthralgias and myalgias (has pain in right shoulder). Negative for back pain and neck pain.   Allergic/Immunologic: Positive for environmental allergies. Negative for food allergies.   Neurological:  Positive for weakness and light-headedness (bending over patient states it is mild). Negative for dizziness and syncope.   Hematological:  Bruises/bleeds easily (bruise).   Psychiatric/Behavioral:  Positive for sleep disturbance (SOB at night uses O2).   "      Objective     VITALS: /76 (BP Location: Left arm, Patient Position: Sitting, Cuff Size: Adult)   Pulse 71   Ht 154 cm (60.63\")   Wt 44.3 kg (97 lb 9.6 oz)   SpO2 90%   BMI 18.67 kg/m²     LABS:   Lab Results (most recent)       None            IMAGING:   No Images in the past 120 days found..    EXAM:  Physical Exam  Vitals and nursing note reviewed.   Constitutional:       Appearance: She is well-developed.   HENT:      Head: Normocephalic.   Neck:      Thyroid: No thyroid mass.      Vascular: No carotid bruit or JVD.      Trachea: Trachea and phonation normal.   Cardiovascular:      Rate and Rhythm: Normal rate and regular rhythm.      Pulses:           Radial pulses are 2+ on the right side and 2+ on the left side.        Posterior tibial pulses are 2+ on the right side and 2+ on the left side.      Heart sounds: Normal heart sounds. No murmur heard.     No friction rub. No gallop.   Pulmonary:      Effort: Pulmonary effort is normal. No respiratory distress.      Breath sounds: Normal breath sounds. No wheezing or rales.   Musculoskeletal:         General: No swelling. Normal range of motion.      Cervical back: Neck supple.   Skin:     General: Skin is warm and dry.      Capillary Refill: Capillary refill takes less than 2 seconds.      Findings: No rash.             Comments: Nodule     Neurological:      Mental Status: She is alert and oriented to person, place, and time.   Psychiatric:         Speech: Speech normal.         Behavior: Behavior normal.         Thought Content: Thought content normal.         Judgment: Judgment normal.         Procedure     ECG 12 Lead    Date/Time: 5/13/2024 11:25 AM  Performed by: Shawn Aranda APRN    Authorized by: Shawn Aranda APRN  Comparison: compared with previous ECG from 9/13/2023  Rhythm: sinus rhythm  Rate: normal  BPM: 73  QRS axis: normal  Comments:  ms  No acute changes               Assessment & Plan    Diagnosis Plan   1. " Precordial pain  ECG 12 Lead    Comprehensive Metabolic Panel    CBC & Differential    Lipid Panel    TSH    Magnesium    Vitamin D 1,25 Dihydroxy    Vitamin B12    Folate      2. Shortness of breath  ECG 12 Lead    Comprehensive Metabolic Panel    CBC & Differential    Lipid Panel    TSH    Magnesium    Vitamin D 1,25 Dihydroxy    Vitamin B12    Folate      3. Mixed hyperlipidemia  ECG 12 Lead    ranolazine (Ranexa) 500 MG 12 hr tablet    rosuvastatin (CRESTOR) 20 MG tablet    Comprehensive Metabolic Panel    CBC & Differential    Lipid Panel    TSH    Magnesium    Vitamin D 1,25 Dihydroxy    Vitamin B12    Folate      1.  We will start patient on Ranexa to advance her antianginal therapy due to the fact of a negative stress test and a relatively unremarkable CTA back in 2022.  If the Ranexa does not seem to help her symptoms over the next month she will discontinue.  2.  Will do a laboratory workup for CBC, CMP, lipid panel, TSH, magnesium, vitamin D, vitamin D B12, and folate.  This will help determine any potential causes of her fatigue as well.  3.  Patient's shortness of breath continues to be stable the only happens if she gets in a hurry.  We will continue to monitor at this time.  4.  Informed of signs and symptoms of ACS and advised to seek emergent treatment for any new worsening symptoms.  Patient also advised sooner follow-up as needed.  Also advised to follow-up with family doctor as needed  This note is dictated utilizing voice recognition software.  Although this record has been proof read, transcriptional errors may still be present. If questions occur regarding the content of this record please do not hesitate to call our office.  I have reviewed and confirmed the accuracy of the ROS as documented by the MA/LPN/RN DELILAH Richardson    Return if symptoms worsen or fail to improve, for Next scheduled follow up.    Diagnoses and all orders for this visit:    1. Precordial pain (Primary)  -     ECG  12 Lead  -     Comprehensive Metabolic Panel; Future  -     CBC & Differential; Future  -     Lipid Panel; Future  -     TSH; Future  -     Magnesium; Future  -     Vitamin D 1,25 Dihydroxy; Future  -     Vitamin B12; Future  -     Folate; Future    2. Shortness of breath  -     ECG 12 Lead  -     Comprehensive Metabolic Panel; Future  -     CBC & Differential; Future  -     Lipid Panel; Future  -     TSH; Future  -     Magnesium; Future  -     Vitamin D 1,25 Dihydroxy; Future  -     Vitamin B12; Future  -     Folate; Future    3. Mixed hyperlipidemia  -     ECG 12 Lead  -     ranolazine (Ranexa) 500 MG 12 hr tablet; Take 1 tablet by mouth 2 (Two) Times a Day.  Dispense: 60 tablet; Refill: 11  -     rosuvastatin (CRESTOR) 20 MG tablet; Take 1 tablet by mouth Daily.  Dispense: 90 tablet; Refill: 3  -     Comprehensive Metabolic Panel; Future  -     CBC & Differential; Future  -     Lipid Panel; Future  -     TSH; Future  -     Magnesium; Future  -     Vitamin D 1,25 Dihydroxy; Future  -     Vitamin B12; Future  -     Folate; Future    Other orders  -     Cancel: ECG 12 Lead        Surinder Boss  reports that she has quit smoking. Her smoking use included cigarettes. She has never used smokeless tobacco. I have educated her on the risk of diseases from using tobacco products. Patient does not smoke.     BMI is within normal parameters. No other follow-up for BMI required.    Advance Care Planning   ACP discussion was held with the patient during this visit. Patient does not have an advance directive, declines further assistance.               MEDS ORDERED DURING VISIT:  New Medications Ordered This Visit   Medications    ranolazine (Ranexa) 500 MG 12 hr tablet     Sig: Take 1 tablet by mouth 2 (Two) Times a Day.     Dispense:  60 tablet     Refill:  11    rosuvastatin (CRESTOR) 20 MG tablet     Sig: Take 1 tablet by mouth Daily.     Dispense:  90 tablet     Refill:  3           This document has been electronically  signed by Shawn Aranda Jr., APRN  May 13, 2024 12:04 EDT

## 2024-05-14 LAB
FOLATE SERPL-MCNC: 17.2 NG/ML (ref 4.78–24.2)
VIT B12 BLD-MCNC: 363 PG/ML (ref 211–946)

## 2024-05-15 ENCOUNTER — TELEPHONE (OUTPATIENT)
Dept: CARDIOLOGY | Facility: CLINIC | Age: 74
End: 2024-05-15
Payer: MEDICARE

## 2024-05-15 DIAGNOSIS — E78.00 ELEVATED SERUM CHOLESTEROL: Primary | ICD-10-CM

## 2024-05-15 DIAGNOSIS — E78.00 ELEVATED LOW DENSITY LIPOPROTEIN (LDL) CHOLESTEROL LEVEL: ICD-10-CM

## 2024-05-15 NOTE — TELEPHONE ENCOUNTER
I called patient and advised of lab results as listed below. She is agreeable to referral to lipid clinic.    Renal function and liver function stable.  Electrolytes normal.  Cholesterol remains elevated despite being on rosuvastatin.  Overall cholesterol 258, .  If she is agreeable would like to consult hyperlipidemia clinic to see if PCSK9 inhibitor would be an option.     
no

## 2024-05-16 ENCOUNTER — TELEPHONE (OUTPATIENT)
Dept: CARDIOLOGY | Facility: CLINIC | Age: 74
End: 2024-05-16
Payer: MEDICARE

## 2024-05-16 LAB — 1,25(OH)2D SERPL-MCNC: 36.7 PG/ML (ref 24.8–81.5)

## 2024-05-29 ENCOUNTER — TELEPHONE (OUTPATIENT)
Dept: PHARMACY | Facility: HOSPITAL | Age: 74
End: 2024-05-29
Payer: MEDICARE

## 2024-05-29 NOTE — TELEPHONE ENCOUNTER
The patient called back after being scheduled and said she did not want to start Cholesterol injections yet. She said she is going to see if she can lower her LDL by taking her cholesterol pills properly for a while and then if that doesn't work she will be in touch with her provider to talk about further medication. I did inform the patient that she would need a new referral at that time. Forwarding this to the provider as well.

## 2024-11-14 ENCOUNTER — LAB (OUTPATIENT)
Dept: LAB | Facility: HOSPITAL | Age: 74
End: 2024-11-14
Payer: MEDICARE

## 2024-11-14 ENCOUNTER — OFFICE VISIT (OUTPATIENT)
Dept: CARDIOLOGY | Facility: CLINIC | Age: 74
End: 2024-11-14
Payer: MEDICARE

## 2024-11-14 VITALS
DIASTOLIC BLOOD PRESSURE: 71 MMHG | SYSTOLIC BLOOD PRESSURE: 113 MMHG | HEIGHT: 61 IN | OXYGEN SATURATION: 93 % | BODY MASS INDEX: 17.94 KG/M2 | HEART RATE: 73 BPM | WEIGHT: 95 LBS

## 2024-11-14 DIAGNOSIS — R06.02 SHORTNESS OF BREATH: ICD-10-CM

## 2024-11-14 DIAGNOSIS — E78.2 MIXED HYPERLIPIDEMIA: ICD-10-CM

## 2024-11-14 DIAGNOSIS — E78.2 MIXED HYPERLIPIDEMIA: Primary | ICD-10-CM

## 2024-11-14 DIAGNOSIS — R07.2 PRECORDIAL PAIN: ICD-10-CM

## 2024-11-14 LAB
CHOLEST SERPL-MCNC: 157 MG/DL (ref 0–200)
HDLC SERPL-MCNC: 65 MG/DL (ref 40–60)
LDLC SERPL CALC-MCNC: 72 MG/DL (ref 0–100)
LDLC/HDLC SERPL: 1.07 {RATIO}
TRIGL SERPL-MCNC: 112 MG/DL (ref 0–150)
VLDLC SERPL-MCNC: 20 MG/DL (ref 5–40)

## 2024-11-14 PROCEDURE — 80061 LIPID PANEL: CPT

## 2024-11-14 PROCEDURE — 99214 OFFICE O/P EST MOD 30 MIN: CPT | Performed by: CLINICAL NURSE SPECIALIST

## 2024-11-14 PROCEDURE — 36415 COLL VENOUS BLD VENIPUNCTURE: CPT

## 2024-11-14 NOTE — PROGRESS NOTES
Pastora Boss is a 74 y.o. female who presents today for Follow-up (6 month follow up).    CHIEF COMPLIANT  Chief Complaint   Patient presents with    Follow-up     6 month follow up       Active Problems:     1.  Chest pain  2  coronary CTA 8/22: No hemodynamically significant coronary artery stenosis  3 stress 12-23: -ischemia, EF >70%  4.  Shortness of breath, COPD - Dr. Barahona  5. echocardiogram 8/22: EF 55 to 60%, grade 1 diastolic dysfunction, physiological TR, trivial to mild MR, and mild AI, normal pulmonary pressures  6.  Fatigue    HPI  The patient is a 74-year-old female that returns for follow-up.  At her last visit she was having some discomfort in her chest and was placed on Ranexa.  She states that her symptoms have slightly improved but not completely resolved.  She states it is only mild and is not interfering with her ability to do her daily tasks.  She is able to walk to the pond and back without having symptoms which her daughter states it is roughly about a quarter of a mile to get to the pond [place] property.  At her last cholesterol check her overall cholesterol was 258 and LDL was 165.  It was recommended that she start on PCSK9 inhibitor.  Once contacted by the hyperlipidemia clinic the patient elected not to start the shot because she had not been taking her statin on a regular basis.  Since that time she has been taking her Crestor daily.  She denies syncope or near syncope.  She does continue to have fatigue.  Her appetite has been low and she has been advised to drink protein shakes by primary care.  She is trying to do this every day but does struggle to accomplish this sometimes.  She had been on vitamin D supplements and is currently taking calcium supplements and other vitamins.  Heart rate and blood pressure stable.    PRIOR MEDS  Current Outpatient Medications on File Prior to Visit   Medication Sig Dispense Refill    albuterol (PROVENTIL) (2.5 MG/3ML) 0.083%  nebulizer solution       albuterol sulfate  (90 Base) MCG/ACT inhaler       aspirin (aspirin) 81 MG EC tablet Take 1 tablet by mouth Daily. 90 tablet 3    budesonide (PULMICORT) 0.5 MG/2ML nebulizer solution       ipratropium-albuterol (DUO-NEB) 0.5-2.5 mg/3 ml nebulizer       nitroglycerin (NITROSTAT) 0.4 MG SL tablet 1 under the tongue as needed for angina, may repeat q5mins for up three doses 30 tablet 5    ranolazine (Ranexa) 500 MG 12 hr tablet Take 1 tablet by mouth 2 (Two) Times a Day. 60 tablet 11    rosuvastatin (CRESTOR) 20 MG tablet Take 1 tablet by mouth Daily. 90 tablet 3    Arginine 2000 MG pack Take 2,000 mg by mouth Daily. 30 each 11    isosorbide mononitrate (IMDUR) 60 MG 24 hr tablet Take 1 tablet by mouth Every Morning. 30 tablet 11    mirtazapine (REMERON) 15 MG tablet Take 1 tablet by mouth Every Night. 1/2 tab po qd       No current facility-administered medications on file prior to visit.       ALLERGIES  Other    HISTORY  Past Medical History:   Diagnosis Date    COPD (chronic obstructive pulmonary disease)        Social History     Socioeconomic History    Marital status:    Tobacco Use    Smoking status: Former     Types: Cigarettes    Smokeless tobacco: Never   Vaping Use    Vaping status: Never Used   Substance and Sexual Activity    Alcohol use: Never    Drug use: Never    Sexual activity: Defer       Family History   Problem Relation Age of Onset    Heart disease Mother     Heart disease Father     Cancer Father        Review of Systems   Constitutional:  Negative for fever.   HENT:  Negative for congestion, postnasal drip, rhinorrhea, sinus pressure and sinus pain.    Respiratory:  Positive for shortness of breath. Negative for chest tightness.    Cardiovascular:  Negative for chest pain, palpitations and leg swelling.   Gastrointestinal: Negative.    Genitourinary: Negative.    Neurological:  Negative for dizziness, syncope and headaches.   Hematological:  Bruises/bleeds  "easily.   Psychiatric/Behavioral:  Negative for sleep disturbance.        Objective     VITALS: /71 (BP Location: Left arm, Patient Position: Sitting, Cuff Size: Adult)   Pulse 73   Ht 154 cm (60.63\")   Wt 43.1 kg (95 lb)   SpO2 93%   BMI 18.17 kg/m²     LABS:   Lab Results (most recent)       None            IMAGING:   No Images in the past 120 days found..    EXAM:  Physical Exam  Constitutional:       Appearance: Normal appearance.   Eyes:      Pupils: Pupils are equal, round, and reactive to light.   Cardiovascular:      Rate and Rhythm: Normal rate and regular rhythm.      Pulses:           Carotid pulses are 2+ on the right side and 2+ on the left side.       Radial pulses are 2+ on the right side and 2+ on the left side.        Dorsalis pedis pulses are 2+ on the right side and 2+ on the left side.        Posterior tibial pulses are 2+ on the right side and 2+ on the left side.      Heart sounds: Normal heart sounds.   Pulmonary:      Effort: Pulmonary effort is normal.      Breath sounds: Normal breath sounds.   Abdominal:      General: Bowel sounds are normal.      Palpations: Abdomen is soft.   Musculoskeletal:      Right lower leg: No edema.      Left lower leg: No edema.   Skin:     General: Skin is warm and dry.      Capillary Refill: Capillary refill takes less than 2 seconds.   Neurological:      General: No focal deficit present.      Mental Status: She is alert and oriented to person, place, and time.   Psychiatric:         Mood and Affect: Mood normal.         Thought Content: Thought content normal.         Procedure   Procedures       Assessment & Plan    Diagnosis Plan   1. Mixed hyperlipidemia  Lipid Panel      2. Precordial pain        3. Shortness of breath          Plan:  1.  Mixed hyperlipidemia: The patient has been compliant with Crestor.  Will recheck a lipid panel.  Will call her these results.  Depending on labs the patient is willing to reconsider hyperlipidemia clinic if " needed.  2.  Precordial pain: Continue Ranexa.  The patient was advised to notify our office if symptoms return or worsen.  She is still occasionally having some tightness in her chest but states this is not interfering with her ability to do her daily activities.  She describes this as a mild tightness but denies actual chest pain.  3.  Shortness of breath: Symptoms are stable.  She was vies notify our office if symptoms become worse.    Return in about 6 months (around 5/14/2025).    Surinder Boss  reports that she has quit smoking. Her smoking use included cigarettes. She has never used smokeless tobacco.            BMI is within normal parameters. No other follow-up for BMI required.           MEDS ORDERED DURING VISIT:  No orders of the defined types were placed in this encounter.      DISCONTINUED MEDS DURING VISIT:   There are no discontinued medications.       This document has been electronically signed by DELILAH Rose  November 14, 2024 11:41 EST    Dictated Utilizing Dragon Dictation: Part of this note may be an electronic transcription/translation of spoken language to printed text using the Dragon Dictation System

## 2024-11-18 ENCOUNTER — TELEPHONE (OUTPATIENT)
Dept: CARDIOLOGY | Facility: CLINIC | Age: 74
End: 2024-11-18
Payer: MEDICARE

## 2024-11-18 NOTE — TELEPHONE ENCOUNTER
First attempt to reach pt.     RELAY      Lipid has improved significantly, continue current meds.

## 2024-11-18 NOTE — TELEPHONE ENCOUNTER
Name: Surinder Boss    Relationship: Self    Best Callback Number: [phone]    HUB PROVIDED THE RELAY MESSAGE FROM THE OFFICE   PATIENT VOICED UNDERSTANDING AND HAS NO FURTHER QUESTIONS AT THIS TIME    ADDITIONAL INFORMATION:  PATIENT SAID TO SAY THANK YOU.

## 2024-11-18 NOTE — TELEPHONE ENCOUNTER
----- Message from Grecia Aranda sent at 11/15/2024  1:57 PM EST -----  Significant improvement in lipid panel.  LDL down to 72 from 165.  Continue current medications.

## 2025-05-15 ENCOUNTER — OFFICE VISIT (OUTPATIENT)
Dept: CARDIOLOGY | Facility: CLINIC | Age: 75
End: 2025-05-15
Payer: MEDICARE

## 2025-05-15 VITALS
HEIGHT: 61 IN | WEIGHT: 90 LBS | OXYGEN SATURATION: 92 % | HEART RATE: 79 BPM | DIASTOLIC BLOOD PRESSURE: 74 MMHG | SYSTOLIC BLOOD PRESSURE: 119 MMHG | BODY MASS INDEX: 16.99 KG/M2

## 2025-05-15 DIAGNOSIS — R06.02 SHORTNESS OF BREATH: ICD-10-CM

## 2025-05-15 DIAGNOSIS — R63.0 DECREASED APPETITE: ICD-10-CM

## 2025-05-15 DIAGNOSIS — R07.2 PRECORDIAL PAIN: ICD-10-CM

## 2025-05-15 DIAGNOSIS — E78.2 MIXED HYPERLIPIDEMIA: Primary | ICD-10-CM

## 2025-05-15 PROCEDURE — 99214 OFFICE O/P EST MOD 30 MIN: CPT | Performed by: CLINICAL NURSE SPECIALIST

## 2025-05-15 RX ORDER — RANOLAZINE 500 MG/1
500 TABLET, EXTENDED RELEASE ORAL 2 TIMES DAILY
Qty: 180 TABLET | Refills: 3 | Status: SHIPPED | OUTPATIENT
Start: 2025-05-15

## 2025-05-15 NOTE — PROGRESS NOTES
Pastora Boss is a 75 y.o. female who presents today for Follow-up (6mth).    CHIEF COMPLIANT  Chief Complaint   Patient presents with    Follow-up     6mth       Active Problems:  1.  Chest pain  2  coronary CTA 8/22: No hemodynamically significant coronary artery stenosis  3 stress 12-23: -ischemia, EF >70%  4.  Shortness of breath, COPD - Dr. Barahona  5. echocardiogram 8/22: EF 55 to 60%, grade 1 diastolic dysfunction, physiological TR, trivial to mild MR, and mild AI, normal pulmonary pressures  6.  Fatigue    HPI  The patient is a 75-year-old female that returns for follow-up.  She continues to have a poor appetite and has lost around 5 pounds since her last visit.  She is accompanied to the visit by her family and they state they are encouraging her to eat and increase protein intake.  They have also been encouraging her to take vitamins but she does not like to take medications.  She has had some episodes of midsternal chest pressure.  This does not occur with activity but she states it has woken her up from sleep a couple times.  She does have some baseline dyspnea but denies worsening symptoms.  She does follow with pulmonology.  She denies syncope, near syncope.      PRIOR MEDS  Current Outpatient Medications on File Prior to Visit   Medication Sig Dispense Refill    albuterol (PROVENTIL) (2.5 MG/3ML) 0.083% nebulizer solution       albuterol sulfate  (90 Base) MCG/ACT inhaler       aspirin (aspirin) 81 MG EC tablet Take 1 tablet by mouth Daily. 90 tablet 3    budesonide (PULMICORT) 0.5 MG/2ML nebulizer solution       ipratropium-albuterol (DUO-NEB) 0.5-2.5 mg/3 ml nebulizer       rosuvastatin (CRESTOR) 20 MG tablet Take 1 tablet by mouth Daily. 90 tablet 3    [DISCONTINUED] ranolazine (Ranexa) 500 MG 12 hr tablet Take 1 tablet by mouth 2 (Two) Times a Day. 60 tablet 11    Arginine 2000 MG pack Take 2,000 mg by mouth Daily. 30 each 11    isosorbide mononitrate (IMDUR) 60 MG 24 hr tablet  "Take 1 tablet by mouth Every Morning. 30 tablet 11    mirtazapine (REMERON) 15 MG tablet Take 1 tablet by mouth Every Night. 1/2 tab po qd      nitroglycerin (NITROSTAT) 0.4 MG SL tablet 1 under the tongue as needed for angina, may repeat q5mins for up three doses (Patient not taking: Reported on 5/15/2025) 30 tablet 5     No current facility-administered medications on file prior to visit.       ALLERGIES  Other    HISTORY  Past Medical History:   Diagnosis Date    COPD (chronic obstructive pulmonary disease)        Social History     Socioeconomic History    Marital status:    Tobacco Use    Smoking status: Former     Types: Cigarettes    Smokeless tobacco: Never   Vaping Use    Vaping status: Never Used   Substance and Sexual Activity    Alcohol use: Never    Drug use: Never    Sexual activity: Defer       Family History   Problem Relation Age of Onset    Heart disease Mother     Heart disease Father     Cancer Father        Review of Systems   Constitutional:  Positive for fatigue. Negative for appetite change.        Pt continues to lose weight and PCP is trying to figure out why. States that they were told one of her heart meds may be effecting her appetite and want to discuss alt rx.     States pt doesn't like protein shakes    Eyes:  Positive for visual disturbance (Glasses daily).   Respiratory:  Positive for chest tightness (Pt c/o chest heaviness and feels like she can't get a breath. States it's tight in the middle.) and shortness of breath (If she rushes, she gets SoB).    Cardiovascular:  Positive for palpitations. Negative for chest pain and leg swelling.   Neurological:  Positive for dizziness, weakness and headaches (Seldom). Negative for syncope and numbness.   Hematological:  Bruises/bleeds easily.        Pt has a spot on her left ankle that is red. States she injured it Easter Sunday and it's still a little red. States sometimes it \"itches under the skin\"      Psychiatric/Behavioral:  " "Negative for sleep disturbance.        Objective     VITALS: /74   Pulse 79   Ht 154 cm (60.63\")   Wt 40.8 kg (90 lb)   SpO2 92%   BMI 17.21 kg/m²     LABS:   Lab Results (most recent)       None            IMAGING:   No Images in the past 120 days found..    EXAM:  Physical Exam  Constitutional:       Appearance: Normal appearance.   Eyes:      Pupils: Pupils are equal, round, and reactive to light.   Cardiovascular:      Rate and Rhythm: Normal rate and regular rhythm.      Pulses:           Carotid pulses are 2+ on the right side and 2+ on the left side.       Radial pulses are 2+ on the right side and 2+ on the left side.        Dorsalis pedis pulses are 2+ on the right side and 2+ on the left side.        Posterior tibial pulses are 2+ on the right side and 2+ on the left side.      Heart sounds: Normal heart sounds.   Pulmonary:      Effort: Pulmonary effort is normal.      Breath sounds: Normal breath sounds.   Abdominal:      General: Bowel sounds are normal.      Palpations: Abdomen is soft.   Musculoskeletal:      Right lower leg: No edema.      Left lower leg: No edema.   Skin:     General: Skin is warm and dry.      Capillary Refill: Capillary refill takes less than 2 seconds.   Neurological:      General: No focal deficit present.      Mental Status: She is alert and oriented to person, place, and time.   Psychiatric:         Mood and Affect: Mood normal.         Thought Content: Thought content normal.         Procedure   Procedures       Assessment & Plan    Diagnosis Plan   1. Mixed hyperlipidemia  ranolazine (Ranexa) 500 MG 12 hr tablet      2. Precordial pain        3. Shortness of breath        4. Decreased appetite             Plan:  1.  Mixed hyperlipidemia: Continue statin.  Most recent lipid panel shows adequate control.  Periodically review lipid panel.    2.  Precordial pain: Continue Ranexa.  She does occasionally have some midsternal tightness which does seem to occur more at " night.  She denies exertional chest pain symptoms.  Will continue current medicine for now as they are concerned Ranexa may impact her appetite.  Both the patient and the family were advised to notify our office if symptoms return or worsen.  3.  Shortness of breath: Symptoms are stable.  She was advised to notify our office if symptoms become worse.  4.  The patient has continued decreased appetite.  She is lost an additional 5 pounds since her last visit.  I did encourage them to discuss this further with primary care.  Also encouraged the patient to start drinking 2 protein shakes per day.    Return in about 6 months (around 11/15/2025).    Surinder Boss  reports that she has quit smoking. Her smoking use included cigarettes. She has never used smokeless tobacco.       BMI is below normal parameters (malnutrition). Recommendations: referral to primary care    Advance Care Planning   ACP discussion was declined by the patient. Patient does not have an advance directive, declines further assistance.           MEDS ORDERED DURING VISIT:  New Medications Ordered This Visit   Medications    ranolazine (Ranexa) 500 MG 12 hr tablet     Sig: Take 1 tablet by mouth 2 (Two) Times a Day.     Dispense:  180 tablet     Refill:  3       DISCONTINUED MEDS DURING VISIT:   Medications Discontinued During This Encounter   Medication Reason    ranolazine (Ranexa) 500 MG 12 hr tablet Reorder          This document has been electronically signed by DELILAH Rose  May 15, 2025 10:59 EDT    Dictated Utilizing Dragon Dictation: Part of this note may be an electronic transcription/translation of spoken language to printed text using the Dragon Dictation System